# Patient Record
Sex: MALE | Race: WHITE | NOT HISPANIC OR LATINO | URBAN - METROPOLITAN AREA
[De-identification: names, ages, dates, MRNs, and addresses within clinical notes are randomized per-mention and may not be internally consistent; named-entity substitution may affect disease eponyms.]

---

## 2024-12-02 ENCOUNTER — APPOINTMENT (OUTPATIENT)
Dept: PRIMARY CARE | Facility: CLINIC | Age: 39
End: 2024-12-02

## 2024-12-12 ENCOUNTER — APPOINTMENT (OUTPATIENT)
Dept: PRIMARY CARE | Facility: CLINIC | Age: 39
End: 2024-12-12

## 2025-01-07 ENCOUNTER — OFFICE VISIT (OUTPATIENT)
Dept: URGENT CARE | Age: 40
End: 2025-01-07
Payer: COMMERCIAL

## 2025-01-07 VITALS
OXYGEN SATURATION: 99 % | HEART RATE: 84 BPM | DIASTOLIC BLOOD PRESSURE: 84 MMHG | TEMPERATURE: 98.5 F | SYSTOLIC BLOOD PRESSURE: 120 MMHG

## 2025-01-07 DIAGNOSIS — U07.1 COVID-19: Primary | ICD-10-CM

## 2025-01-07 DIAGNOSIS — R68.89 FLU-LIKE SYMPTOMS: ICD-10-CM

## 2025-01-07 LAB
POC BINAX EXPIRATION: 0
POC BINAX NOW COVID SERIAL NUMBER: 0
POC RAPID INFLUENZA A: NEGATIVE
POC RAPID INFLUENZA B: NEGATIVE
POC SARS-COV-2 AG BINAX: ABNORMAL

## 2025-01-07 PROCEDURE — 99203 OFFICE O/P NEW LOW 30 MIN: CPT

## 2025-01-07 PROCEDURE — 87804 INFLUENZA ASSAY W/OPTIC: CPT

## 2025-01-07 PROCEDURE — 87811 SARS-COV-2 COVID19 W/OPTIC: CPT

## 2025-01-07 ASSESSMENT — ENCOUNTER SYMPTOMS
CHILLS: 1
COUGH: 1
FATIGUE: 1
RHINORRHEA: 1

## 2025-01-07 NOTE — PATIENT INSTRUCTIONS
You were seen at Urgent Care today and diagnosed with COVID-19. Please treat as discussed.  Monitor for red flags which we spoke about, If your symptoms change, worsen or become concerning in any way, please go to the emergency room immediately, otherwise you can followup with your PCP in 2-3 days as needed

## 2025-01-07 NOTE — PROGRESS NOTES
Subjective   Patient ID: LISA Haji is a 39 y.o. male. They present today with a chief complaint of flu like symptoms (Flu-like symptoms x 3 days. ).    History of Present Illness  Patient is a pleasant 39-year-old male with no reported past medical history presents urgent care today with flulike symptoms.  He states his symptoms started roughly 3 days ago.  Specifically he endorses runny nose, body aches, congestion and chills.  He notes several other members of his family are currently sick with similar symptoms.  He has been using over-the-counter medication without significant relief.  He denies any chest pain or difficulty breathing.      History provided by:  Patient      Past Medical History  Allergies as of 01/07/2025    (No Known Allergies)       (Not in a hospital admission)         No past medical history on file.    No past surgical history on file.     reports that he has never smoked. He has never used smokeless tobacco. He reports that he does not drink alcohol and does not use drugs.    Review of Systems  Review of Systems   Constitutional:  Positive for chills and fatigue.   HENT:  Positive for congestion and rhinorrhea.    Respiratory:  Positive for cough.                                   Objective    Vitals:    01/07/25 1403   BP: 120/84   BP Location: Left arm   Patient Position: Sitting   BP Cuff Size: Large adult   Pulse: 84   Temp: 36.9 °C (98.5 °F)   TempSrc: Oral   SpO2: 99%     No LMP for male patient.    Physical Exam  Vitals and nursing note reviewed.   Constitutional:       General: He is not in acute distress.     Appearance: Normal appearance. He is not ill-appearing, toxic-appearing or diaphoretic.   HENT:      Head: Normocephalic and atraumatic.      Nose: Congestion present.      Mouth/Throat:      Mouth: Mucous membranes are moist.   Eyes:      Extraocular Movements: Extraocular movements intact.      Conjunctiva/sclera: Conjunctivae normal.      Pupils: Pupils are equal,  round, and reactive to light.   Cardiovascular:      Rate and Rhythm: Normal rate and regular rhythm.      Pulses: Normal pulses.      Heart sounds: Normal heart sounds.   Pulmonary:      Effort: Pulmonary effort is normal. No respiratory distress.      Breath sounds: Normal breath sounds. No stridor. No wheezing, rhonchi or rales.   Chest:      Chest wall: No tenderness.   Musculoskeletal:         General: Normal range of motion.      Cervical back: Normal range of motion and neck supple.   Skin:     General: Skin is warm and dry.      Capillary Refill: Capillary refill takes less than 2 seconds.   Neurological:      General: No focal deficit present.      Mental Status: He is alert and oriented to person, place, and time.   Psychiatric:         Mood and Affect: Mood normal.         Behavior: Behavior normal.         Procedures      Assessment/Plan   Allergies, medications, history, and pertinent labs/EKGs/Imaging reviewed by AVI Barbosa.     Medical Decision Making    Patient is well appearing, afebrile, non toxic, not hypoxic, and appropriate for outpatient treatment and management at time of evaluation. Patient presents with flulike symptoms.     Differential includes but not limited to: COVID, influenza, URI, other    Physical exam as described above.  Rapid flu is negative.  Rapid COVID is positive.    Recommended continued use of over-the-counter medication as needed for symptom relief as well as close follow-up with PCP.  Red flags and ER precautions discussed.  Patient voices understanding and is agreeable to this plan.  He was discharged stable condition.  All questions and concerns addressed.           Orders and Diagnoses  Diagnoses and all orders for this visit:  COVID-19  Flu-like symptoms  -     POCT Covid-19 Rapid Antigen  -     POCT Influenza A/B manually resulted      Medical Admin Record      Follow Up Instructions  No follow-ups on file.    Patient disposition:  Home    Electronically signed by AVI Barbosa  2:10 PM

## 2025-04-15 ENCOUNTER — APPOINTMENT (OUTPATIENT)
Dept: CARDIOLOGY | Facility: HOSPITAL | Age: 40
End: 2025-04-15
Payer: COMMERCIAL

## 2025-04-15 ENCOUNTER — HOSPITAL ENCOUNTER (INPATIENT)
Facility: HOSPITAL | Age: 40
LOS: 2 days | Discharge: HOME | End: 2025-04-17
Attending: EMERGENCY MEDICINE | Admitting: INTERNAL MEDICINE
Payer: COMMERCIAL

## 2025-04-15 ENCOUNTER — APPOINTMENT (OUTPATIENT)
Dept: RADIOLOGY | Facility: HOSPITAL | Age: 40
End: 2025-04-15
Payer: COMMERCIAL

## 2025-04-15 DIAGNOSIS — I21.4 NSTEMI (NON-ST ELEVATED MYOCARDIAL INFARCTION) (MULTI): Primary | ICD-10-CM

## 2025-04-15 DIAGNOSIS — R73.9 HYPERGLYCEMIA: ICD-10-CM

## 2025-04-15 DIAGNOSIS — E10.29 TYPE I (JUVENILE TYPE) DIABETES MELLITUS WITH RENAL MANIFESTATIONS, UNCONTROLLED(250.43) (MULTI): ICD-10-CM

## 2025-04-15 DIAGNOSIS — E10.65 TYPE I (JUVENILE TYPE) DIABETES MELLITUS WITH RENAL MANIFESTATIONS, UNCONTROLLED(250.43) (MULTI): ICD-10-CM

## 2025-04-15 DIAGNOSIS — R06.02 SHORTNESS OF BREATH: ICD-10-CM

## 2025-04-15 LAB
ALBUMIN SERPL BCP-MCNC: 4.1 G/DL (ref 3.4–5)
ALP SERPL-CCNC: 76 U/L (ref 33–120)
ALT SERPL W P-5'-P-CCNC: 19 U/L (ref 10–52)
ANION GAP BLDV CALCULATED.4IONS-SCNC: 11 MMOL/L (ref 10–25)
ANION GAP SERPL CALC-SCNC: 12 MMOL/L (ref 10–20)
AST SERPL W P-5'-P-CCNC: 14 U/L (ref 9–39)
B-OH-BUTYR SERPL-SCNC: 0.18 MMOL/L (ref 0.02–0.27)
BASE EXCESS BLDV CALC-SCNC: -1.4 MMOL/L (ref -2–3)
BASOPHILS # BLD AUTO: 0.05 X10*3/UL (ref 0–0.1)
BASOPHILS NFR BLD AUTO: 0.4 %
BILIRUB DIRECT SERPL-MCNC: 0.1 MG/DL (ref 0–0.3)
BILIRUB SERPL-MCNC: 0.7 MG/DL (ref 0–1.2)
BODY TEMPERATURE: 37 DEGREES CELSIUS
BUN SERPL-MCNC: 16 MG/DL (ref 6–23)
CA-I BLDV-SCNC: 1.2 MMOL/L (ref 1.1–1.33)
CALCIUM SERPL-MCNC: 8.8 MG/DL (ref 8.6–10.3)
CARDIAC TROPONIN I PNL SERPL HS: 116 NG/L (ref 0–20)
CARDIAC TROPONIN I PNL SERPL HS: 129 NG/L (ref 0–20)
CARDIAC TROPONIN I PNL SERPL HS: 71 NG/L (ref 0–20)
CHLORIDE BLDV-SCNC: 101 MMOL/L (ref 98–107)
CHLORIDE SERPL-SCNC: 102 MMOL/L (ref 98–107)
CO2 SERPL-SCNC: 25 MMOL/L (ref 21–32)
CREAT SERPL-MCNC: 1.01 MG/DL (ref 0.5–1.3)
EGFRCR SERPLBLD CKD-EPI 2021: >90 ML/MIN/1.73M*2
EOSINOPHIL # BLD AUTO: 0.04 X10*3/UL (ref 0–0.7)
EOSINOPHIL NFR BLD AUTO: 0.3 %
ERYTHROCYTE [DISTWIDTH] IN BLOOD BY AUTOMATED COUNT: 12.2 % (ref 11.5–14.5)
GLUCOSE BLD MANUAL STRIP-MCNC: 194 MG/DL (ref 74–99)
GLUCOSE BLD MANUAL STRIP-MCNC: 265 MG/DL (ref 74–99)
GLUCOSE BLD MANUAL STRIP-MCNC: 355 MG/DL (ref 74–99)
GLUCOSE BLD MANUAL STRIP-MCNC: 431 MG/DL (ref 74–99)
GLUCOSE BLDV-MCNC: 528 MG/DL (ref 74–99)
GLUCOSE SERPL-MCNC: 470 MG/DL (ref 74–99)
HCO3 BLDV-SCNC: 24.8 MMOL/L (ref 22–26)
HCT VFR BLD AUTO: 41.3 % (ref 41–52)
HCT VFR BLD EST: 45 % (ref 41–52)
HGB BLD-MCNC: 13.8 G/DL (ref 13.5–17.5)
HGB BLDV-MCNC: 14.9 G/DL (ref 13.5–17.5)
IMM GRANULOCYTES # BLD AUTO: 0.05 X10*3/UL (ref 0–0.7)
IMM GRANULOCYTES NFR BLD AUTO: 0.4 % (ref 0–0.9)
INHALED O2 CONCENTRATION: 21 %
LACTATE BLDV-SCNC: 2.4 MMOL/L (ref 0.4–2)
LACTATE BLDV-SCNC: 3.2 MMOL/L (ref 0.4–2)
LACTATE SERPL-SCNC: 1 MMOL/L (ref 0.4–2)
LACTATE SERPL-SCNC: 2.2 MMOL/L (ref 0.4–2)
LACTATE SERPL-SCNC: 2.8 MMOL/L (ref 0.4–2)
LYMPHOCYTES # BLD AUTO: 1.96 X10*3/UL (ref 1.2–4.8)
LYMPHOCYTES NFR BLD AUTO: 16.7 %
MAGNESIUM SERPL-MCNC: 1.7 MG/DL (ref 1.6–2.4)
MCH RBC QN AUTO: 30.1 PG (ref 26–34)
MCHC RBC AUTO-ENTMCNC: 33.4 G/DL (ref 32–36)
MCV RBC AUTO: 90 FL (ref 80–100)
MONOCYTES # BLD AUTO: 0.86 X10*3/UL (ref 0.1–1)
MONOCYTES NFR BLD AUTO: 7.3 %
NEUTROPHILS # BLD AUTO: 8.75 X10*3/UL (ref 1.2–7.7)
NEUTROPHILS NFR BLD AUTO: 74.9 %
NRBC BLD-RTO: 0 /100 WBCS (ref 0–0)
OSMOLALITY SERPL: 305 MOSM/KG (ref 280–300)
OXYHGB MFR BLDV: 77.1 % (ref 45–75)
PCO2 BLDV: 46 MM HG (ref 41–51)
PH BLDV: 7.34 PH (ref 7.33–7.43)
PHOSPHATE SERPL-MCNC: 3.7 MG/DL (ref 2.5–4.9)
PLATELET # BLD AUTO: 186 X10*3/UL (ref 150–450)
PO2 BLDV: 49 MM HG (ref 35–45)
POTASSIUM BLDV-SCNC: 4.4 MMOL/L (ref 3.5–5.3)
POTASSIUM SERPL-SCNC: 4.3 MMOL/L (ref 3.5–5.3)
PROT SERPL-MCNC: 6.7 G/DL (ref 6.4–8.2)
RBC # BLD AUTO: 4.59 X10*6/UL (ref 4.5–5.9)
SAO2 % BLDV: 79 % (ref 45–75)
SODIUM BLDV-SCNC: 132 MMOL/L (ref 136–145)
SODIUM SERPL-SCNC: 135 MMOL/L (ref 136–145)
WBC # BLD AUTO: 11.7 X10*3/UL (ref 4.4–11.3)

## 2025-04-15 PROCEDURE — 82947 ASSAY GLUCOSE BLOOD QUANT: CPT

## 2025-04-15 PROCEDURE — 84132 ASSAY OF SERUM POTASSIUM: CPT | Performed by: EMERGENCY MEDICINE

## 2025-04-15 PROCEDURE — 83735 ASSAY OF MAGNESIUM: CPT | Performed by: EMERGENCY MEDICINE

## 2025-04-15 PROCEDURE — 83605 ASSAY OF LACTIC ACID: CPT | Performed by: EMERGENCY MEDICINE

## 2025-04-15 PROCEDURE — 84681 ASSAY OF C-PEPTIDE: CPT | Mod: AHULAB | Performed by: INTERNAL MEDICINE

## 2025-04-15 PROCEDURE — 84075 ASSAY ALKALINE PHOSPHATASE: CPT | Performed by: EMERGENCY MEDICINE

## 2025-04-15 PROCEDURE — 93005 ELECTROCARDIOGRAM TRACING: CPT

## 2025-04-15 PROCEDURE — 99291 CRITICAL CARE FIRST HOUR: CPT | Performed by: EMERGENCY MEDICINE

## 2025-04-15 PROCEDURE — 83930 ASSAY OF BLOOD OSMOLALITY: CPT | Mod: AHULAB | Performed by: EMERGENCY MEDICINE

## 2025-04-15 PROCEDURE — 71045 X-RAY EXAM CHEST 1 VIEW: CPT

## 2025-04-15 PROCEDURE — 83519 RIA NONANTIBODY: CPT | Performed by: INTERNAL MEDICINE

## 2025-04-15 PROCEDURE — 84484 ASSAY OF TROPONIN QUANT: CPT | Performed by: EMERGENCY MEDICINE

## 2025-04-15 PROCEDURE — 85025 COMPLETE CBC W/AUTO DIFF WBC: CPT | Performed by: EMERGENCY MEDICINE

## 2025-04-15 PROCEDURE — 96360 HYDRATION IV INFUSION INIT: CPT

## 2025-04-15 PROCEDURE — 2500000002 HC RX 250 W HCPCS SELF ADMINISTERED DRUGS (ALT 637 FOR MEDICARE OP, ALT 636 FOR OP/ED): Performed by: INTERNAL MEDICINE

## 2025-04-15 PROCEDURE — 82010 KETONE BODYS QUAN: CPT | Performed by: EMERGENCY MEDICINE

## 2025-04-15 PROCEDURE — 36415 COLL VENOUS BLD VENIPUNCTURE: CPT | Performed by: INTERNAL MEDICINE

## 2025-04-15 PROCEDURE — 84484 ASSAY OF TROPONIN QUANT: CPT | Performed by: INTERNAL MEDICINE

## 2025-04-15 PROCEDURE — 36415 COLL VENOUS BLD VENIPUNCTURE: CPT | Performed by: EMERGENCY MEDICINE

## 2025-04-15 PROCEDURE — 84100 ASSAY OF PHOSPHORUS: CPT | Performed by: EMERGENCY MEDICINE

## 2025-04-15 PROCEDURE — 71045 X-RAY EXAM CHEST 1 VIEW: CPT | Performed by: RADIOLOGY

## 2025-04-15 PROCEDURE — 83605 ASSAY OF LACTIC ACID: CPT | Performed by: INTERNAL MEDICINE

## 2025-04-15 PROCEDURE — 2500000004 HC RX 250 GENERAL PHARMACY W/ HCPCS (ALT 636 FOR OP/ED): Performed by: INTERNAL MEDICINE

## 2025-04-15 PROCEDURE — 1100000001 HC PRIVATE ROOM DAILY

## 2025-04-15 PROCEDURE — 83036 HEMOGLOBIN GLYCOSYLATED A1C: CPT | Mod: AHULAB | Performed by: INTERNAL MEDICINE

## 2025-04-15 PROCEDURE — 2500000004 HC RX 250 GENERAL PHARMACY W/ HCPCS (ALT 636 FOR OP/ED): Performed by: EMERGENCY MEDICINE

## 2025-04-15 RX ORDER — INSULIN GLARGINE 100 [IU]/ML
15 INJECTION, SOLUTION SUBCUTANEOUS NIGHTLY
Status: DISCONTINUED | OUTPATIENT
Start: 2025-04-15 | End: 2025-04-17

## 2025-04-15 RX ORDER — ACETAMINOPHEN 325 MG/1
650 TABLET ORAL EVERY 4 HOURS PRN
Status: DISCONTINUED | OUTPATIENT
Start: 2025-04-15 | End: 2025-04-17 | Stop reason: HOSPADM

## 2025-04-15 RX ORDER — ACETAMINOPHEN 160 MG/5ML
650 SUSPENSION ORAL EVERY 4 HOURS PRN
Status: DISCONTINUED | OUTPATIENT
Start: 2025-04-15 | End: 2025-04-17 | Stop reason: HOSPADM

## 2025-04-15 RX ORDER — POLYETHYLENE GLYCOL 3350 17 G/17G
17 POWDER, FOR SOLUTION ORAL DAILY
Status: DISCONTINUED | OUTPATIENT
Start: 2025-04-15 | End: 2025-04-17 | Stop reason: HOSPADM

## 2025-04-15 RX ORDER — INSULIN LISPRO 100 [IU]/ML
4 INJECTION, SOLUTION INTRAVENOUS; SUBCUTANEOUS
Status: DISCONTINUED | OUTPATIENT
Start: 2025-04-15 | End: 2025-04-17

## 2025-04-15 RX ORDER — SODIUM CHLORIDE 9 MG/ML
100 INJECTION, SOLUTION INTRAVENOUS CONTINUOUS
Status: DISCONTINUED | OUTPATIENT
Start: 2025-04-15 | End: 2025-04-17 | Stop reason: HOSPADM

## 2025-04-15 RX ORDER — DEXTROSE 50 % IN WATER (D50W) INTRAVENOUS SYRINGE
12.5
Status: DISCONTINUED | OUTPATIENT
Start: 2025-04-15 | End: 2025-04-17 | Stop reason: HOSPADM

## 2025-04-15 RX ORDER — ACETAMINOPHEN 650 MG/1
650 SUPPOSITORY RECTAL EVERY 4 HOURS PRN
Status: DISCONTINUED | OUTPATIENT
Start: 2025-04-15 | End: 2025-04-17 | Stop reason: HOSPADM

## 2025-04-15 RX ORDER — DEXTROSE 50 % IN WATER (D50W) INTRAVENOUS SYRINGE
25
Status: DISCONTINUED | OUTPATIENT
Start: 2025-04-15 | End: 2025-04-17 | Stop reason: HOSPADM

## 2025-04-15 RX ORDER — ONDANSETRON 4 MG/1
4 TABLET, FILM COATED ORAL EVERY 8 HOURS PRN
Status: DISCONTINUED | OUTPATIENT
Start: 2025-04-15 | End: 2025-04-17 | Stop reason: HOSPADM

## 2025-04-15 RX ORDER — HEPARIN SODIUM 5000 [USP'U]/ML
5000 INJECTION, SOLUTION INTRAVENOUS; SUBCUTANEOUS EVERY 12 HOURS
Status: DISCONTINUED | OUTPATIENT
Start: 2025-04-15 | End: 2025-04-17 | Stop reason: HOSPADM

## 2025-04-15 RX ORDER — ONDANSETRON HYDROCHLORIDE 2 MG/ML
4 INJECTION, SOLUTION INTRAVENOUS EVERY 8 HOURS PRN
Status: DISCONTINUED | OUTPATIENT
Start: 2025-04-15 | End: 2025-04-17 | Stop reason: HOSPADM

## 2025-04-15 RX ADMIN — INSULIN LISPRO 4 UNITS: 100 INJECTION, SOLUTION INTRAVENOUS; SUBCUTANEOUS at 21:26

## 2025-04-15 RX ADMIN — SODIUM CHLORIDE 100 ML/HR: 0.9 INJECTION, SOLUTION INTRAVENOUS at 21:27

## 2025-04-15 RX ADMIN — SODIUM CHLORIDE 2000 ML: 0.9 INJECTION, SOLUTION INTRAVENOUS at 13:44

## 2025-04-15 RX ADMIN — INSULIN GLARGINE 15 UNITS: 100 INJECTION, SOLUTION SUBCUTANEOUS at 21:26

## 2025-04-15 RX ADMIN — HEPARIN SODIUM 5000 UNITS: 5000 INJECTION, SOLUTION INTRAVENOUS; SUBCUTANEOUS at 21:25

## 2025-04-15 SDOH — SOCIAL STABILITY: SOCIAL INSECURITY: DOES ANYONE TRY TO KEEP YOU FROM HAVING/CONTACTING OTHER FRIENDS OR DOING THINGS OUTSIDE YOUR HOME?: NO

## 2025-04-15 SDOH — HEALTH STABILITY: MENTAL HEALTH: HOW MANY DRINKS CONTAINING ALCOHOL DO YOU HAVE ON A TYPICAL DAY WHEN YOU ARE DRINKING?: PATIENT DOES NOT DRINK

## 2025-04-15 SDOH — ECONOMIC STABILITY: FOOD INSECURITY: HOW HARD IS IT FOR YOU TO PAY FOR THE VERY BASICS LIKE FOOD, HOUSING, MEDICAL CARE, AND HEATING?: NOT VERY HARD

## 2025-04-15 SDOH — ECONOMIC STABILITY: FOOD INSECURITY: WITHIN THE PAST 12 MONTHS, YOU WORRIED THAT YOUR FOOD WOULD RUN OUT BEFORE YOU GOT THE MONEY TO BUY MORE.: NEVER TRUE

## 2025-04-15 SDOH — SOCIAL STABILITY: SOCIAL INSECURITY: WITHIN THE LAST YEAR, HAVE YOU BEEN HUMILIATED OR EMOTIONALLY ABUSED IN OTHER WAYS BY YOUR PARTNER OR EX-PARTNER?: NO

## 2025-04-15 SDOH — ECONOMIC STABILITY: HOUSING INSECURITY: AT ANY TIME IN THE PAST 12 MONTHS, WERE YOU HOMELESS OR LIVING IN A SHELTER (INCLUDING NOW)?: NO

## 2025-04-15 SDOH — HEALTH STABILITY: MENTAL HEALTH: HOW OFTEN DO YOU HAVE A DRINK CONTAINING ALCOHOL?: NEVER

## 2025-04-15 SDOH — ECONOMIC STABILITY: FOOD INSECURITY: WITHIN THE PAST 12 MONTHS, THE FOOD YOU BOUGHT JUST DIDN'T LAST AND YOU DIDN'T HAVE MONEY TO GET MORE.: NEVER TRUE

## 2025-04-15 SDOH — ECONOMIC STABILITY: HOUSING INSECURITY: IN THE LAST 12 MONTHS, WAS THERE A TIME WHEN YOU WERE NOT ABLE TO PAY THE MORTGAGE OR RENT ON TIME?: NO

## 2025-04-15 SDOH — SOCIAL STABILITY: SOCIAL INSECURITY
WITHIN THE LAST YEAR, HAVE YOU BEEN KICKED, HIT, SLAPPED, OR OTHERWISE PHYSICALLY HURT BY YOUR PARTNER OR EX-PARTNER?: NO

## 2025-04-15 SDOH — SOCIAL STABILITY: SOCIAL INSECURITY: HAVE YOU HAD THOUGHTS OF HARMING ANYONE ELSE?: NO

## 2025-04-15 SDOH — HEALTH STABILITY: MENTAL HEALTH: HOW OFTEN DO YOU HAVE SIX OR MORE DRINKS ON ONE OCCASION?: NEVER

## 2025-04-15 SDOH — ECONOMIC STABILITY: HOUSING INSECURITY: IN THE PAST 12 MONTHS, HOW MANY TIMES HAVE YOU MOVED WHERE YOU WERE LIVING?: 0

## 2025-04-15 SDOH — SOCIAL STABILITY: SOCIAL INSECURITY: WITHIN THE LAST YEAR, HAVE YOU BEEN AFRAID OF YOUR PARTNER OR EX-PARTNER?: NO

## 2025-04-15 SDOH — ECONOMIC STABILITY: INCOME INSECURITY: IN THE PAST 12 MONTHS HAS THE ELECTRIC, GAS, OIL, OR WATER COMPANY THREATENED TO SHUT OFF SERVICES IN YOUR HOME?: NO

## 2025-04-15 SDOH — SOCIAL STABILITY: SOCIAL INSECURITY: HAVE YOU HAD ANY THOUGHTS OF HARMING ANYONE ELSE?: NO

## 2025-04-15 SDOH — SOCIAL STABILITY: SOCIAL INSECURITY
WITHIN THE LAST YEAR, HAVE YOU BEEN RAPED OR FORCED TO HAVE ANY KIND OF SEXUAL ACTIVITY BY YOUR PARTNER OR EX-PARTNER?: NO

## 2025-04-15 SDOH — ECONOMIC STABILITY: TRANSPORTATION INSECURITY: IN THE PAST 12 MONTHS, HAS LACK OF TRANSPORTATION KEPT YOU FROM MEDICAL APPOINTMENTS OR FROM GETTING MEDICATIONS?: NO

## 2025-04-15 SDOH — SOCIAL STABILITY: SOCIAL INSECURITY: ABUSE: ADULT

## 2025-04-15 SDOH — SOCIAL STABILITY: SOCIAL INSECURITY: ARE THERE ANY APPARENT SIGNS OF INJURIES/BEHAVIORS THAT COULD BE RELATED TO ABUSE/NEGLECT?: NO

## 2025-04-15 SDOH — SOCIAL STABILITY: SOCIAL INSECURITY: WERE YOU ABLE TO COMPLETE ALL THE BEHAVIORAL HEALTH SCREENINGS?: YES

## 2025-04-15 SDOH — SOCIAL STABILITY: SOCIAL INSECURITY: ARE YOU OR HAVE YOU BEEN THREATENED OR ABUSED PHYSICALLY, EMOTIONALLY, OR SEXUALLY BY ANYONE?: NO

## 2025-04-15 SDOH — SOCIAL STABILITY: SOCIAL INSECURITY: DO YOU FEEL ANYONE HAS EXPLOITED OR TAKEN ADVANTAGE OF YOU FINANCIALLY OR OF YOUR PERSONAL PROPERTY?: NO

## 2025-04-15 SDOH — SOCIAL STABILITY: SOCIAL INSECURITY: HAS ANYONE EVER THREATENED TO HURT YOUR FAMILY OR YOUR PETS?: NO

## 2025-04-15 SDOH — SOCIAL STABILITY: SOCIAL INSECURITY: DO YOU FEEL UNSAFE GOING BACK TO THE PLACE WHERE YOU ARE LIVING?: NO

## 2025-04-15 ASSESSMENT — ACTIVITIES OF DAILY LIVING (ADL)
HEARING - LEFT EAR: FUNCTIONAL
BATHING: INDEPENDENT
TOILETING: INDEPENDENT
LACK_OF_TRANSPORTATION: NO
GROOMING: INDEPENDENT
WALKS IN HOME: INDEPENDENT
PATIENT'S MEMORY ADEQUATE TO SAFELY COMPLETE DAILY ACTIVITIES?: YES
FEEDING YOURSELF: INDEPENDENT
ADEQUATE_TO_COMPLETE_ADL: YES
DRESSING YOURSELF: INDEPENDENT
JUDGMENT_ADEQUATE_SAFELY_COMPLETE_DAILY_ACTIVITIES: YES
HEARING - RIGHT EAR: FUNCTIONAL

## 2025-04-15 ASSESSMENT — COGNITIVE AND FUNCTIONAL STATUS - GENERAL
DAILY ACTIVITIY SCORE: 24
PATIENT BASELINE BEDBOUND: NO
MOBILITY SCORE: 24

## 2025-04-15 ASSESSMENT — COLUMBIA-SUICIDE SEVERITY RATING SCALE - C-SSRS
2. HAVE YOU ACTUALLY HAD ANY THOUGHTS OF KILLING YOURSELF?: NO
6. HAVE YOU EVER DONE ANYTHING, STARTED TO DO ANYTHING, OR PREPARED TO DO ANYTHING TO END YOUR LIFE?: NO
1. IN THE PAST MONTH, HAVE YOU WISHED YOU WERE DEAD OR WISHED YOU COULD GO TO SLEEP AND NOT WAKE UP?: NO

## 2025-04-15 ASSESSMENT — PAIN - FUNCTIONAL ASSESSMENT: PAIN_FUNCTIONAL_ASSESSMENT: 0-10

## 2025-04-15 ASSESSMENT — PAIN SCALES - GENERAL
PAINLEVEL_OUTOF10: 0 - NO PAIN
PAINLEVEL_OUTOF10: 0 - NO PAIN

## 2025-04-15 ASSESSMENT — PATIENT HEALTH QUESTIONNAIRE - PHQ9
SUM OF ALL RESPONSES TO PHQ9 QUESTIONS 1 & 2: 0
1. LITTLE INTEREST OR PLEASURE IN DOING THINGS: NOT AT ALL
2. FEELING DOWN, DEPRESSED OR HOPELESS: NOT AT ALL

## 2025-04-15 ASSESSMENT — LIFESTYLE VARIABLES
AUDIT-C TOTAL SCORE: 0
SKIP TO QUESTIONS 9-10: 1

## 2025-04-15 NOTE — ED TRIAGE NOTES
Pt here by EMS for dizziness/hyperglycemia; pt reports hx of type 1 diabetes with insurance lapse has had to give himself subcutaneous injections;     EMS gave pt 350ml NS

## 2025-04-15 NOTE — PROGRESS NOTES
Pharmacy Medication History     Source of Information: Patient and also stated he hasn't taken his insulin's in more than a few months. Pt. Feel like his dose's need to be adjusted.    Additional concerns with the patient's PTA list.   N/A  Notified Provider via Haiku : No    The following updates were made to the Prior to Admission medication list:     Medications ADDED:   N/A  Medications CHANGED:  N/A  Medications REMOVED:   N/A  Medications NOT TAKING:   Novolin N NPH U-100 injection   Novolin R U100 Insulin injection     Allergy reviewed : Yes    Meds 2 Beds : Yes    Outpatient pharmacy confirmed and updated in chart : Yes    Pharmacy name: [facility] - [place] [id number]    The list below reflectives the updated PTA list. Please review each medication in order reconciliation for additional clarification and justification.    Prior to Admission Medications   Prescriptions Last Dose   NovoLIN N NPH U-100 Insulin 100 unit/mL injection More than a month   Sig: Inject 10 Units under the skin 2 times a day before meals.   Patient not taking: Reported on 4/15/2025   NovoLIN R Regular U100 Insulin 100 unit/mL injection More than a month   Si Units.   Patient not taking: Reported on 4/15/2025      Facility-Administered Medications: None       The list below reflectives the updated allergy list. Please review each documented allergy for additional clarification and justification.    No Known Allergies       04/15/25 at 5:04 PM - Yady Erickson

## 2025-04-16 ENCOUNTER — APPOINTMENT (OUTPATIENT)
Dept: CARDIOLOGY | Facility: HOSPITAL | Age: 40
End: 2025-04-16
Payer: COMMERCIAL

## 2025-04-16 DIAGNOSIS — E13.9: Primary | ICD-10-CM

## 2025-04-16 LAB
ALBUMIN SERPL BCP-MCNC: 3.4 G/DL (ref 3.4–5)
ALP SERPL-CCNC: 60 U/L (ref 33–120)
ALT SERPL W P-5'-P-CCNC: 17 U/L (ref 10–52)
ANION GAP SERPL CALC-SCNC: 11 MMOL/L (ref 10–20)
AORTIC VALVE MEAN GRADIENT: 3 MMHG
AORTIC VALVE PEAK VELOCITY: 1.2 M/S
AST SERPL W P-5'-P-CCNC: 15 U/L (ref 9–39)
ATRIAL RATE: 84 BPM
ATRIAL RATE: 86 BPM
AV PEAK GRADIENT: 6 MMHG
AVA (PEAK VEL): 3.46 CM2
AVA (VTI): 3.47 CM2
BILIRUB SERPL-MCNC: 0.8 MG/DL (ref 0–1.2)
BUN SERPL-MCNC: 17 MG/DL (ref 6–23)
C PEPTIDE SERPL-MCNC: 1.8 NG/ML (ref 0.7–3.9)
CALCIUM SERPL-MCNC: 8.5 MG/DL (ref 8.6–10.3)
CHLORIDE SERPL-SCNC: 110 MMOL/L (ref 98–107)
CHOLEST SERPL-MCNC: 146 MG/DL (ref 0–199)
CHOLESTEROL/HDL RATIO: 3.4
CO2 SERPL-SCNC: 25 MMOL/L (ref 21–32)
CREAT SERPL-MCNC: 0.79 MG/DL (ref 0.5–1.3)
EGFRCR SERPLBLD CKD-EPI 2021: >90 ML/MIN/1.73M*2
EJECTION FRACTION APICAL 4 CHAMBER: 64.9
EJECTION FRACTION: 61 %
ERYTHROCYTE [DISTWIDTH] IN BLOOD BY AUTOMATED COUNT: 12.3 % (ref 11.5–14.5)
EST. AVERAGE GLUCOSE BLD GHB EST-MCNC: 249 MG/DL
GLUCOSE BLD MANUAL STRIP-MCNC: 101 MG/DL (ref 74–99)
GLUCOSE BLD MANUAL STRIP-MCNC: 112 MG/DL (ref 74–99)
GLUCOSE BLD MANUAL STRIP-MCNC: 161 MG/DL (ref 74–99)
GLUCOSE BLD MANUAL STRIP-MCNC: 173 MG/DL (ref 74–99)
GLUCOSE SERPL-MCNC: 203 MG/DL (ref 74–99)
HBA1C MFR BLD: 10.3 %
HCT VFR BLD AUTO: 39.7 % (ref 41–52)
HDLC SERPL-MCNC: 42.8 MG/DL
HGB BLD-MCNC: 13.4 G/DL (ref 13.5–17.5)
LDLC SERPL CALC-MCNC: 80 MG/DL
LEFT ATRIUM VOLUME AREA LENGTH INDEX BSA: 32.8 ML/M2
LEFT VENTRICLE INTERNAL DIMENSION DIASTOLE: 4.74 CM (ref 3.5–6)
LEFT VENTRICULAR OUTFLOW TRACT DIAMETER: 2.07 CM
MAGNESIUM SERPL-MCNC: 1.78 MG/DL (ref 1.6–2.4)
MCH RBC QN AUTO: 30.4 PG (ref 26–34)
MCHC RBC AUTO-ENTMCNC: 33.8 G/DL (ref 32–36)
MCV RBC AUTO: 90 FL (ref 80–100)
MITRAL VALVE E/A RATIO: 1.59
NON HDL CHOLESTEROL: 103 MG/DL (ref 0–149)
NRBC BLD-RTO: 0 /100 WBCS (ref 0–0)
P AXIS: -13 DEGREES
P AXIS: 46 DEGREES
P OFFSET: 198 MS
P OFFSET: 200 MS
P ONSET: 140 MS
P ONSET: 148 MS
PLATELET # BLD AUTO: 177 X10*3/UL (ref 150–450)
POTASSIUM SERPL-SCNC: 3.7 MMOL/L (ref 3.5–5.3)
PR INTERVAL: 144 MS
PR INTERVAL: 164 MS
PROT SERPL-MCNC: 5.6 G/DL (ref 6.4–8.2)
Q ONSET: 220 MS
Q ONSET: 222 MS
QRS COUNT: 13 BEATS
QRS COUNT: 14 BEATS
QRS DURATION: 82 MS
QRS DURATION: 92 MS
QT INTERVAL: 348 MS
QT INTERVAL: 364 MS
QTC CALCULATION(BAZETT): 416 MS
QTC CALCULATION(BAZETT): 430 MS
QTC FREDERICIA: 392 MS
QTC FREDERICIA: 407 MS
R AXIS: -14 DEGREES
R AXIS: -7 DEGREES
RBC # BLD AUTO: 4.41 X10*6/UL (ref 4.5–5.9)
RIGHT VENTRICLE FREE WALL PEAK S': 12 CM/S
RIGHT VENTRICLE PEAK SYSTOLIC PRESSURE: 25.3 MMHG
SODIUM SERPL-SCNC: 142 MMOL/L (ref 136–145)
T AXIS: -12 DEGREES
T AXIS: 46 DEGREES
T OFFSET: 396 MS
T OFFSET: 402 MS
TRICUSPID ANNULAR PLANE SYSTOLIC EXCURSION: 2.2 CM
TRIGL SERPL-MCNC: 115 MG/DL (ref 0–149)
VENTRICULAR RATE: 84 BPM
VENTRICULAR RATE: 86 BPM
VLDL: 23 MG/DL (ref 0–40)
WBC # BLD AUTO: 9.4 X10*3/UL (ref 4.4–11.3)

## 2025-04-16 PROCEDURE — 93005 ELECTROCARDIOGRAM TRACING: CPT

## 2025-04-16 PROCEDURE — 2500000004 HC RX 250 GENERAL PHARMACY W/ HCPCS (ALT 636 FOR OP/ED): Performed by: INTERNAL MEDICINE

## 2025-04-16 PROCEDURE — 99223 1ST HOSP IP/OBS HIGH 75: CPT | Performed by: STUDENT IN AN ORGANIZED HEALTH CARE EDUCATION/TRAINING PROGRAM

## 2025-04-16 PROCEDURE — 1100000001 HC PRIVATE ROOM DAILY

## 2025-04-16 PROCEDURE — 2500000001 HC RX 250 WO HCPCS SELF ADMINISTERED DRUGS (ALT 637 FOR MEDICARE OP): Performed by: INTERNAL MEDICINE

## 2025-04-16 PROCEDURE — 99221 1ST HOSP IP/OBS SF/LOW 40: CPT | Performed by: INTERNAL MEDICINE

## 2025-04-16 PROCEDURE — 93306 TTE W/DOPPLER COMPLETE: CPT | Performed by: STUDENT IN AN ORGANIZED HEALTH CARE EDUCATION/TRAINING PROGRAM

## 2025-04-16 PROCEDURE — 2500000002 HC RX 250 W HCPCS SELF ADMINISTERED DRUGS (ALT 637 FOR MEDICARE OP, ALT 636 FOR OP/ED): Performed by: INTERNAL MEDICINE

## 2025-04-16 PROCEDURE — 80053 COMPREHEN METABOLIC PANEL: CPT | Performed by: INTERNAL MEDICINE

## 2025-04-16 PROCEDURE — 93306 TTE W/DOPPLER COMPLETE: CPT

## 2025-04-16 PROCEDURE — 83735 ASSAY OF MAGNESIUM: CPT | Performed by: INTERNAL MEDICINE

## 2025-04-16 PROCEDURE — 84478 ASSAY OF TRIGLYCERIDES: CPT | Performed by: NURSE PRACTITIONER

## 2025-04-16 PROCEDURE — 36415 COLL VENOUS BLD VENIPUNCTURE: CPT | Performed by: INTERNAL MEDICINE

## 2025-04-16 PROCEDURE — 82947 ASSAY GLUCOSE BLOOD QUANT: CPT

## 2025-04-16 PROCEDURE — 85027 COMPLETE CBC AUTOMATED: CPT | Performed by: INTERNAL MEDICINE

## 2025-04-16 RX ADMIN — ONDANSETRON HYDROCHLORIDE 4 MG: 4 TABLET, FILM COATED ORAL at 12:18

## 2025-04-16 RX ADMIN — INSULIN GLARGINE 15 UNITS: 100 INJECTION, SOLUTION SUBCUTANEOUS at 21:15

## 2025-04-16 RX ADMIN — ACETAMINOPHEN 650 MG: 325 TABLET ORAL at 17:05

## 2025-04-16 RX ADMIN — INSULIN LISPRO 4 UNITS: 100 INJECTION, SOLUTION INTRAVENOUS; SUBCUTANEOUS at 17:00

## 2025-04-16 RX ADMIN — HEPARIN SODIUM 5000 UNITS: 5000 INJECTION, SOLUTION INTRAVENOUS; SUBCUTANEOUS at 21:15

## 2025-04-16 RX ADMIN — SODIUM CHLORIDE 100 ML/HR: 0.9 INJECTION, SOLUTION INTRAVENOUS at 17:01

## 2025-04-16 RX ADMIN — SODIUM CHLORIDE 100 ML/HR: 0.9 INJECTION, SOLUTION INTRAVENOUS at 06:56

## 2025-04-16 RX ADMIN — INSULIN LISPRO 4 UNITS: 100 INJECTION, SOLUTION INTRAVENOUS; SUBCUTANEOUS at 12:18

## 2025-04-16 RX ADMIN — INSULIN LISPRO 4 UNITS: 100 INJECTION, SOLUTION INTRAVENOUS; SUBCUTANEOUS at 08:33

## 2025-04-16 SDOH — SOCIAL STABILITY: SOCIAL INSECURITY: ARE YOU MARRIED, WIDOWED, DIVORCED, SEPARATED, NEVER MARRIED, OR LIVING WITH A PARTNER?: LIVING WITH PARTNER

## 2025-04-16 SDOH — ECONOMIC STABILITY: FOOD INSECURITY: WITHIN THE PAST 12 MONTHS, THE FOOD YOU BOUGHT JUST DIDN'T LAST AND YOU DIDN'T HAVE MONEY TO GET MORE.: NEVER TRUE

## 2025-04-16 SDOH — HEALTH STABILITY: MENTAL HEALTH: HOW OFTEN DO YOU HAVE SIX OR MORE DRINKS ON ONE OCCASION?: NEVER

## 2025-04-16 SDOH — ECONOMIC STABILITY: FOOD INSECURITY: WITHIN THE PAST 12 MONTHS, YOU WORRIED THAT YOUR FOOD WOULD RUN OUT BEFORE YOU GOT THE MONEY TO BUY MORE.: NEVER TRUE

## 2025-04-16 SDOH — HEALTH STABILITY: MENTAL HEALTH: HOW OFTEN DO YOU HAVE A DRINK CONTAINING ALCOHOL?: 2-4 TIMES A MONTH

## 2025-04-16 SDOH — HEALTH STABILITY: MENTAL HEALTH: HOW MANY DRINKS CONTAINING ALCOHOL DO YOU HAVE ON A TYPICAL DAY WHEN YOU ARE DRINKING?: 1 OR 2

## 2025-04-16 SDOH — ECONOMIC STABILITY: HOUSING INSECURITY: AT ANY TIME IN THE PAST 12 MONTHS, WERE YOU HOMELESS OR LIVING IN A SHELTER (INCLUDING NOW)?: NO

## 2025-04-16 SDOH — ECONOMIC STABILITY: HOUSING INSECURITY: IN THE PAST 12 MONTHS, HOW MANY TIMES HAVE YOU MOVED WHERE YOU WERE LIVING?: 0

## 2025-04-16 SDOH — ECONOMIC STABILITY: FOOD INSECURITY: HOW HARD IS IT FOR YOU TO PAY FOR THE VERY BASICS LIKE FOOD, HOUSING, MEDICAL CARE, AND HEATING?: NOT HARD AT ALL

## 2025-04-16 SDOH — ECONOMIC STABILITY: HOUSING INSECURITY: IN THE LAST 12 MONTHS, WAS THERE A TIME WHEN YOU WERE NOT ABLE TO PAY THE MORTGAGE OR RENT ON TIME?: NO

## 2025-04-16 SDOH — ECONOMIC STABILITY: INCOME INSECURITY: IN THE PAST 12 MONTHS HAS THE ELECTRIC, GAS, OIL, OR WATER COMPANY THREATENED TO SHUT OFF SERVICES IN YOUR HOME?: NO

## 2025-04-16 SDOH — ECONOMIC STABILITY: TRANSPORTATION INSECURITY: IN THE PAST 12 MONTHS, HAS LACK OF TRANSPORTATION KEPT YOU FROM MEDICAL APPOINTMENTS OR FROM GETTING MEDICATIONS?: NO

## 2025-04-16 ASSESSMENT — COGNITIVE AND FUNCTIONAL STATUS - GENERAL
DAILY ACTIVITIY SCORE: 24
MOBILITY SCORE: 24

## 2025-04-16 ASSESSMENT — LIFESTYLE VARIABLES
AUDIT-C TOTAL SCORE: 2
SKIP TO QUESTIONS 9-10: 1

## 2025-04-16 ASSESSMENT — PAIN SCALES - GENERAL
PAINLEVEL_OUTOF10: 7
PAINLEVEL_OUTOF10: 0 - NO PAIN
PAINLEVEL_OUTOF10: 0 - NO PAIN

## 2025-04-16 ASSESSMENT — ENCOUNTER SYMPTOMS
SHORTNESS OF BREATH: 0
UNEXPECTED WEIGHT CHANGE: 0
ENDOCRINE COMMENTS: AS ABOVE

## 2025-04-16 ASSESSMENT — ACTIVITIES OF DAILY LIVING (ADL): LACK_OF_TRANSPORTATION: NO

## 2025-04-16 ASSESSMENT — PAIN DESCRIPTION - LOCATION: LOCATION: HEAD

## 2025-04-16 NOTE — PROGRESS NOTES
I was asked to speak with this patient by Diabetic Educator Hardy regarding if any pharmacy barriers.    I spoke with patient at bedside and we discussed pharmacy costs. He reports he was using the discount [facility] insulin when he did not have insurance. I did not fully probe on why he stopped taking insulin due to him being drowsy.     Discussed that we can get most insulins to $35/month with copay cards, but his insurance is showing he has a deductible. Did mention if the plan is for a basal/bolus regimen, the different insulins would each have a copay so total might be $70 per month which patient seems ok with.     *Ran test claims on Novolin N, Humalog, and insulin aspart. All show deductible/copay >$35 on test. Insurance does not indicate any product preference on these test claims.*    Discussed with patient CGMs are not covered under his pharmacy benefit but he could call his insurance to see if they might be covered under his medical benefit through DME. The Lisa device is $75/month without insurance coverage which patient seems ok with.    Briefly mentioned a few preventative medications, like statin and ACEi/ARB for cardiovascular and renal protection in diabetes.     Patient does report he is interested in establishing with a new PCP. Discussed briefly on Healthy at Home which he is open to.     Happy to come back in the am if patient has any questions for me/needs additional pharmacy counseling. Feel free to reach out if any further test claims are needed on insulin products prior to scripts being sent.    Please [facility] med[facility]supplies [facility] for assistance with copays/coupons.    Patrica Snyder, ZainD

## 2025-04-16 NOTE — CARE PLAN
The patient's goals for the shift include      The clinical goals for the shift include Pt to remain HDS this shift      Problem: Pain - Adult  Goal: Verbalizes/displays adequate comfort level or baseline comfort level  Outcome: Progressing     Problem: Safety - Adult  Goal: Free from fall injury  Outcome: Progressing     Problem: Discharge Planning  Goal: Discharge to home or other facility with appropriate resources  Outcome: Progressing     Problem: Chronic Conditions and Co-morbidities  Goal: Patient's chronic conditions and co-morbidity symptoms are monitored and maintained or improved  Outcome: Progressing     Problem: Nutrition  Goal: Nutrient intake appropriate for maintaining nutritional needs  Outcome: Progressing     Problem: Diabetes  Goal: Achieve decreasing blood glucose levels by end of shift  Outcome: Progressing  Goal: Increase stability of blood glucose readings by end of shift  Outcome: Progressing  Goal: Decrease in ketones present in urine by end of shift  Outcome: Progressing  Goal: Maintain electrolyte levels within acceptable range throughout shift  Outcome: Progressing  Goal: Maintain glucose levels >70mg/dl to <250mg/dl throughout shift  Outcome: Progressing  Goal: No changes in neurological exam by end of shift  Outcome: Progressing  Goal: Learn about and adhere to nutrition recommendations by end of shift  Outcome: Progressing  Goal: Vital signs within normal range for age by end of shift  Outcome: Progressing  Goal: Increase self care and/or family involovement by end of shift  Outcome: Progressing  Goal: Receive DSME education by end of shift  Outcome: Progressing

## 2025-04-16 NOTE — CONSULTS
"Reason For Consult  Uncontrolled DM.     History Of Present Illness  Reese Haji is a 39 y.o. male presenting with dizziness/hyperglycemia.     Past Medical History  He has a past medical history of Diabetes 1.5, managed as type 1 (Multi).    Surgical History  He has a past surgical history that includes Hernia repair (04/18/2016).     Social History  He reports that he has never smoked. He has never used smokeless tobacco. He reports current alcohol use. He reports current drug use. Drug: Marijuana.    Family History  Family History[1]     Allergies  Patient has no known allergies.    Review of Systems  Denies N/V or dizziness  Denies chest pain     Physical Exam  NAD, pleasant, lying in bed. Engaged in consult     Last Recorded Vitals  Blood pressure 141/88, pulse 69, temperature 35.9 °C (96.6 °F), temperature source Temporal, resp. rate 18, height 1.778 m (5' 10\"), weight 74.8 kg (165 lb), SpO2 99%.    Relevant Results  HbA1C 10.3%  Last POCT 161mg/dl     Assessment/Plan   Reese Haji is a 39 year old T1DM with limited medical history noted. Yesterday, he experience a near syncopal episode, his BG was 350mg/dl and was brought in for evaluation.     DM/Hx  -states he has was diagnosed with T2DM but recently told it progressed to T1DM  -had acute episode 10 months ago  -endorses peripheral lower extremity neuropathy and vision changes  -denies personal or family CV, HTN or kidney disease  -very strong family history of DM  -does need new PCP and establish care with endocrinology   -referral list given    Medications/BG monitoring  -was on basal/bolus insulin    -loss of insurance prohibited him for obtaining insulin  -reports vision changes and \"see black dots\" shortly after taking insulin   -attributed it to low BG; would treat accordingly and symptoms would resolve  -will review insulin pen administration   -has not been checking BG  -interested in CGM   -MTB assisting    -CGM training/education when " warranted    Diet/exercise  -pt reports very busy/ stressful work-life   -manager at restaurant  -young kids at home  -is very knowledgeable about diet    -made diet changes when first diagnosed 7-8 years ago   -notes he counts carbs, avoids sugar   -difficult finding time to eat, typically 1 meal /day    -encouraged more frequent meals and snacks   -seems to be adhering to DM diet    Plan  -home med recs  -CGM insurance coverage vs out of pocket  -follow up with endocrinology  -discharge when med ready  Vidal Sue RN         [1] No family history on file.

## 2025-04-16 NOTE — PROGRESS NOTES
PROGRESS NOTE - INTERNAL MEDICINE     PATIENT NAME:  Reese Haji    MRN:  [id number]  SERVICE DATE:  4/16/2025       ADMITTING PHYSICIAN:  Marielle Tyler MD    ASSESSMENT AND PLAN    Orthostatic hypotension with dizziness, from hypovolemia; resolved with hydration today  DM type I, uncontrolled from lack of medications (started as DM 2 at age 23, suspected to have transformed to DM1 in 2024, based on very low C-peptide levels). C peptide 1.8, A1c 10.3  this adm.  Elevated troponin, uncertain etiology.  Probable stress-induced  Acute Lactic acidosis, resolved with hydration     Plan:  Resume long-acting insulin and Premeal lispro.  Endocrine/cardiology consult.  Telemetry.  Counseled on compliance with medications and appropriate diet.  Diabetic nurse education.  Check [facility] 65 antibody.    Encourage aggressive hydration and monitor orthostatics.      DISPOSITION PLAN:  Pending cardiol and endocrine input.    INTERVAL HISTORY OF PRESENT ILLNESS:  nausea and dry heaving today afternoon. No orthostatic dizziness. No chest pain/sob. No n/v/d. Oral intake good. Feeling better. No fever/chills. acute events from last 24 hrs reviewed.    Pertinent ROS:  No abdominal pain / No Bleeding / No rashes    Discussed with nursing and case management team and the specialists involved in this patient's care. Reviewed the EMR and documentation from other care-givers.      OBJECTIVE  PHYSICAL EXAM:     GENERAL: AAOx3, cooperative resting comfortably  SKIN: Skin turgor normal. No rashes  HEENT: no epistaxis, Moist mucosa.  LUNGS: Bilat breath sounds, with no added sounds  CARDIAC: REGULAR. no rubs, no murmur  ABDOMEN: soft, non-tender. +BS.  EXTREMITIES: No edema, Good capillary refill.   NEURO: Insight GOOD. No invol movements.   MUSCULOSKELETAL: No acute inflammation            4/15/2025     8:20 PM 4/15/2025     8:21 PM 4/16/2025    12:38 AM 4/16/2025     4:23 AM 4/16/2025     4:24 AM 4/16/2025     4:25 AM 4/16/2025     8:07 AM    Vitals   Systolic 144 127 135 124 134 145 149   Diastolic 95 92 90 72 78 78 86   BP Location Right arm Right arm Right arm Right arm Right arm Right arm Right arm   Heart Rate 75 81 75 64 66 70 74   Temp 36 °C (96.8 °F) 36.1 °C (97 °F) 36 °C (96.8 °F) 36 °C (96.8 °F) 36.3 °C (97.3 °F) 36.3 °C (97.3 °F) 36.1 °C (97 °F)   Resp 18 18 18 18 18 18 17     Body mass index is 23.68 kg/m².    Intake/Output Summary (Last 24 hours) at 4/16/2025 1141  Last data filed at 4/16/2025 0313  Gross per 24 hour   Intake 576.66 ml   Output --   Net 576.66 ml         Current Medications[1]    DATA:   Diagnostic tests reviewed for today's visit:    Most recent labs  Results from last 7 days   Lab Units 04/16/25  0518 04/15/25  1319   WBC AUTO x10*3/uL 9.4 11.7*   HEMOGLOBIN g/dL 13.4* 13.8   HEMATOCRIT % 39.7* 41.3   PLATELETS AUTO x10*3/uL 177 186     Results from last 7 days   Lab Units 04/16/25  0518 04/15/25  1319   SODIUM mmol/L 142 135*   POTASSIUM mmol/L 3.7 4.3   CHLORIDE mmol/L 110* 102   CO2 mmol/L 25 25   BUN mg/dL 17 16   CREATININE mg/dL 0.79 1.01   CALCIUM mg/dL 8.5* 8.8   PROTEIN TOTAL g/dL 5.6* 6.7   BILIRUBIN TOTAL mg/dL 0.8 0.7   ALK PHOS U/L 60 76   ALT U/L 17 19   AST U/L 15 14   GLUCOSE mg/dL 203* 470*             Results from last 7 days   Lab Units 04/16/25  0803 04/15/25  2003 04/15/25  1720 04/15/25  1425 04/15/25  1312   POCT GLUCOSE mg/dL 173* 194* 265* 355* 431*        XR chest 1 view   Final Result   1.  No evidence of acute cardiopulmonary process.                  MACRO:   None        Signed by: Dmitry Alberto 4/15/2025 4:53 PM   Dictation workstation:   [id number]      Transthoracic Echo (TTE) Complete    (Results Pending)   Nuclear Stress Test    (Results Pending)         SIGNATURE: Marielle Tyler MD PATIENT NAME: Reese Haji   DATE: 4/16/2025 MRN: [id number]   TIME: 11:41 AM             [1]   Current Facility-Administered Medications:     acetaminophen (Tylenol) tablet 650 mg, 650 mg, oral,  q4h PRN **OR** acetaminophen (Tylenol) suspension 650 mg, 650 mg, oral, q4h PRN **OR** acetaminophen (Tylenol) suppository 650 mg, 650 mg, rectal, q4h PRN, Marielle Tyler MD    dextrose 50 % injection 12.5 g, 12.5 g, intravenous, q15 min PRN, Marielle Tyler MD    dextrose 50 % injection 25 g, 25 g, intravenous, q15 min PRN, Marielle Tyler MD    glucagon (Glucagen) injection 1 mg, 1 mg, intramuscular, q15 min PRN, Marielle Tyler MD    glucagon (Glucagen) injection 1 mg, 1 mg, intramuscular, q15 min PRN, Marielle Tyler MD    heparin (porcine) injection 5,000 Units, 5,000 Units, subcutaneous, q12h, Marielle Tyler MD, 5,000 Units at 04/15/25 2125    insulin glargine (Lantus) injection 15 Units, 15 Units, subcutaneous, Nightly, Marielle Tyler MD, 15 Units at 04/15/25 2126    insulin lispro injection 4 Units, 4 Units, subcutaneous, TID AC, Marielle Tyler MD, 4 Units at 04/16/25 0833    ondansetron (Zofran) tablet 4 mg, 4 mg, oral, q8h PRN **OR** ondansetron (Zofran) injection 4 mg, 4 mg, intravenous, q8h PRN, Marielle Tyler MD    polyethylene glycol (Glycolax, Miralax) packet 17 g, 17 g, oral, Daily, Marielle Tyler MD    sodium chloride 0.9% infusion, 100 mL/hr, intravenous, Continuous, Marielle Tyler MD, Last Rate: 100 mL/hr at 04/16/25 0857, 100 mL/hr at 04/16/25 0857

## 2025-04-16 NOTE — PROGRESS NOTES
04/16/25 1052   Discharge Planning   Living Arrangements Spouse/significant other;Children   Support Systems Spouse/significant other;Children   Assistance Needed none   Type of Residence Private residence   Number of Stairs to Enter Residence 4   Number of Stairs Within Residence 12   Who is requesting discharge planning? Provider   Home or Post Acute Services None   Expected Discharge Disposition Home   Does the patient need discharge transport arranged? No   Financial Resource Strain   How hard is it for you to pay for the very basics like food, housing, medical care, and heating? Not hard   Housing Stability   In the last 12 months, was there a time when you were not able to pay the mortgage or rent on time? N   In the past 12 months, how many times have you moved where you were living? 0   At any time in the past 12 months, were you homeless or living in a shelter (including now)? N   Transportation Needs   In the past 12 months, has lack of transportation kept you from medical appointments or from getting medications? no   In the past 12 months, has lack of transportation kept you from meetings, work, or from getting things needed for daily living? No   Stroke Family Assessment   Stroke Family Assessment Needed No   Intensity of Service   Intensity of Service 0-30 min     4/16/25 1052  Met with patient at bedside to discuss discharge planning.  PCP is Dr Kiki Winters and pharmacy is [facility] on [address] in [place].  He lives at home with his fiance and three kids in  a house.   He is independent with ADLs and drives at baseline.  He does not use any assistive devices for ambulation. He plans on returning home at discharge.  He denies any HHC, DME, or discharge needs.  He will notify the [facility] should any needs arise.  ADOD pending cardio and endocrine consults.  Mima Bonilla RN [facility]

## 2025-04-16 NOTE — CONSULTS
"Inpatient consult to Cardiology  Consult performed by: Higinio HAYWARD MD  Consult ordered by: Marielle Tyler MD  Reason for consult: elevated troponin .        History Of Present Illness:    Reese Haji is a 39 y.o. male with past medical history of DM1, (A1c 10.3), who presented to [facility] [place]iness, found to have hyperglycemia without DKA.  Cardiology consulted for syncope, elevated troponin.     In ED blood sugar was found to be 500 with normal anion gap, normal kidney function, and normal potassium.  CXR clear.     States he quit checking his blood sugar, and quit taking his insulin about 3 months ago.  He was driving, when he began to feel dizzy, and was seeing black spots. He was also very fatigued.  He actually thought his blood sugar was low, but it was 416 when he checked it. He thought he may passed out, but no syncopal episode.  States he has a pinpoint area to his chest that feels like \"poking a bruise\" every couple of days, not with exertion, but more feels like a muscle spasm.  Denies any shortness of breath.  Denies any arm or leg swelling.  He had an episode this morning where he thought he may vomit due to phlegm in his throat.  NO recent diarrhea.      No home CV medications.   He does not follow with cardiology as outpatient  No family history of early CAD or sudden death  Denies smoking or ETOH use    He works as a manager at [employer]    Past Cardiology Tests (Last 3 Years):    EKG:      Echo: na    Cath: na    Stress Test: na      Past Medical History:  He has a past medical history of Diabetes 1.5, managed as type 1 (Multi).    Past Surgical History:  He has a past surgical history that includes Hernia repair (04/18/2016).      Social History:  He reports that he has never smoked. He has never used smokeless tobacco. He reports current alcohol use. He reports current drug use. Drug: Marijuana.    Family History:  Family History[1]     Allergies:  Patient has no known " "allergies.    ROS:  10 point review of systems including (Constitutional, Eyes, ENMT, Respiratory, Cardiac, Gastrointestinal, Neurological, Psychiatric, and Hematologic) was performed and is otherwise negative.    Objective Data:  Last Recorded Vitals:  Vitals:    25 0038 25 0423 25 0424 25 0425   BP: 135/90 124/72 134/78 145/78   BP Location: Right arm Right arm Right arm Right arm   Patient Position: Lying Lying Lying Lying   Pulse: 75 64 66 70   Resp: 18 18 18 18   Temp: 36 °C (96.8 °F) 36 °C (96.8 °F) 36.3 °C (97.3 °F) 36.3 °C (97.3 °F)   TempSrc: Temporal Temporal Temporal Temporal   SpO2: 99% 98% 97% 96%   Weight:       Height:         Medical Gas Therapy: None (Room air)  Weight  Av.8 kg (165 lb)  Min: 74.8 kg (165 lb)  Max: 74.8 kg (165 lb)    LABS:  CMP:  Results from last 7 days   Lab Units 25  0518 04/15/25  1319   SODIUM mmol/L 142 135*   POTASSIUM mmol/L 3.7 4.3   CHLORIDE mmol/L 110* 102   CO2 mmol/L 25 25   ANION GAP mmol/L 11 12   BUN mg/dL 17 16   CREATININE mg/dL 0.79 1.01   EGFR mL/min/1.73m*2 >90 >90   MAGNESIUM mg/dL 1.78 1.70   ALBUMIN g/dL 3.4 4.1   ALT U/L 17 19   AST U/L 15 14   BILIRUBIN TOTAL mg/dL 0.8 0.7     CBC:  Results from last 7 days   Lab Units 25  0518 04/15/25  1319   WBC AUTO x10*3/uL 9.4 11.7*   HEMOGLOBIN g/dL 13.4* 13.8   HEMATOCRIT % 39.7* 41.3   PLATELETS AUTO x10*3/uL 177 186   MCV fL 90 90     COAG:     ABO: No results found for: \"ABO\"  HEME/ENDO:  Results from last 7 days   Lab Units 04/15/25  1319   HEMOGLOBIN A1C % 10.3*      CARDIAC:   Results from last 7 days   Lab Units 04/15/25  1922 04/15/25  1413 04/15/25  1319   TROPHS ng/L 71* 129* 116*      Results from last 7 days   Lab Units 04/15/25  1319   HEMOGLOBIN A1C % 10.3*        Last I/O:    Intake/Output Summary (Last 24 hours) at 2025 0811  Last data filed at 2025 0313  Gross per 24 hour   Intake 576.66 ml   Output --   Net 576.66 ml     Net IO Since Admission: " 576.66 mL [04/16/25 0811]      Imaging Results:      Inpatient Medications:  Scheduled Medications[2]  PRN Medications[3]  Continuous Medications[4]    Outpatient Medications:  Current Outpatient Medications   Medication Instructions    NovoLIN N NPH U-100 Insulin 10 Units, 2 times daily before meals    NovoLIN R Regular U100 Insulin 6 Units       Physical Exam:    General:  Patient is awake, alert, and oriented.  Patient is in no acute distress.  HEENT:  Normocephalic.  Moist mucosa.    Neck:  Normal Jugular Venous Pressure.  Cardiovascular:  Regular rate and rhythm.  Normal S1 and S2, no murmurs, rubs or gallops  Pulmonary:  Clear to auscultation bilaterally.  Abdomen:  Soft. Non-tender.   Non-distended.  Positive bowel sounds.  Lower Extremities:  2+ pedal pulses. No LE edema.  Neurologic:  Alert and oriented x3. No focal deficit.   Skin: Skin warm and dry, normal skin turgor.   Psychiatric: Normal affect.      Assessment/Plan     Reese Haji is a 39 y.o. male with past medical history of DM1, (A1c 10.3), who [place] [facility] [place] found to have hyperglycemia without DKA.  Cardiology consulted for syncope, elevated troponin.     In ED blood sugar was found to be 500 with normal anion gap, normal kidney function, and normal potassium.  CXR clear.     Assessment:    # DM 1  # Hyperglycemia  # Medication non  adherence    - admit blood sugar 400   - given 2L fluids in ED   - quit taking insulin and checking blood sugar about 3 months ago.    # Elevated troponin.  Low level elevation with a flat trend consistent with a non-MI troponin elevation / chronic non-traumatic myocardial injury in the setting of above. Core measures do not apply.  There are no significant clinical signs / symptoms or ischemic changes consistent with ACS.    Plan:  - We will obtain a transthoracic echocardiogram for structural evaluation including ejection fraction, assessment of regional wall motion abnormalities or valvular  disease, and further evaluation of hemodynamics.  - Recommend home BP monitoring.  Low threshold to initiate ACE / ARB  - Adding lipid panel  - Recommend outpatient nuclear exercise stress test ( ordered )    He can follow up with Dr. Dunaway in office in one month to review BP log and stress test results    If echo results are unremarkable, OK to discharge home from a cardiology standpoint.       Code Status:  Full Code      Nina Arellano, APRN-CNP      Thank you for allowing me to participate in their care.  Please feel free to call me with any further questions or concerns.    Higinio Dunaway MD, FAC, [facility]  Division of Cardiovascular Medicine  System Director, Nuclear Cardiology   Medical Director, [facility] [place], [facility]   Clinical , [facility] [employer]  [email or web address]   Office:  [phone]          Both the SATURNINO and I have had a face to face encounter with the patient today. I have examined the patient and edited the documented physical examination as necessary.  I personally reviewed the patient's vital signs, telemetry, recent labs, medications, orders, EKGs, and pertinent cardiac imaging/ echocardiography.  I have reviewed the SATURNINO's encounter note, approve the SATURNINO's documentation and have edited the note to reflect my diagnostic and therapeutic plan.           [1] No family history on file.  [2]   Scheduled medications   Medication Dose Route Frequency    heparin  5,000 Units subcutaneous q12h    insulin glargine  15 Units subcutaneous Nightly    insulin lispro  4 Units subcutaneous TID AC    polyethylene glycol  17 g oral Daily   [3]   PRN medications   Medication    acetaminophen    Or    acetaminophen    Or    acetaminophen    dextrose    dextrose    glucagon    glucagon    ondansetron    Or    ondansetron   [4]   Continuous Medications   Medication Dose Last  Rate    sodium chloride 0.9%  100 mL/hr 100 mL/hr (04/16/25 0656)

## 2025-04-16 NOTE — H&P
HISTORY AND PHYSICAL EXAMINATION    PATIENT NAME: Reese Haji    MRN: [id number]  SERVICE DATE: 4/15/2025       PRIMARY CARE PHYSICIAN: Kiki Winters MD          ASSESSMENT AND PLAN     DM type I, uncontrolled from lack of medications (started as DM 2 at age 23, suspected to have transformed to DM1 in 2024, based on very low C-peptide levels)  Orthostatic hypotension with dizziness, from hypovolemia  Elevated troponin, uncertain etiology.  Probable stress-induced  Acute Lactic acidosis, resolved with hydration    Plan:  Resume long-acting insulin and Premeal lispro.  Endocrine/cardiology consult.  Telemetry.  Counseled on compliance with medications and appropriate diet.  Diabetic nurse education.  Repeat C-peptide levels.  Check MERCY 65 antibody.    Encourage aggressive hydration and monitor orthostatics.      Discussed with nurses/case management team and the specialists involved in this patient's care. Reviewed the EMR and documentation from other care-givers.    SUBJECTIVE  CHIEF COMPLAINT:  dizziness and high glu    HPI: This is a 39 y.o. male who was brought to ER by EMS from his workplace following dizziness and high blood sugars.  Patient has gradually cut down the NPH insulin as he had symptoms of low sugar, despite having blood sugars in the 150s.  Today, while driving to work, he had symptoms that he resumed to be low sugar symptoms.  When he reached work, his blood sugars were more than 400.  He had dizziness and thus had his coworker called EMS.  High blood sugars were confirmed again in the ER.  Patient was given aggressive hydration.  Labs revealed elevated troponin and high lactic acid.  He was not in DKA.  Patient was admitted for further evaluation and management.  After almost 2 L of IV fluids in the ER, repeat orthostatics still remain abnormal.    PAST MEDICAL HISTORY:   Past Medical History:   Diagnosis Date    Diabetes 1.5, managed as type 1 (Multi)      PAST SURGICAL HISTORY:   Past  Surgical History:   Procedure Laterality Date    HERNIA REPAIR  04/18/2016    Inguinal Hernia Repair     FAMILY HISTORY: +DM  SOCIAL HISTORY:   Social History     Tobacco Use    Smoking status: Never    Smokeless tobacco: Never   Substance Use Topics    Alcohol use: Yes     Comment: socially    Drug use: Yes     Types: Marijuana       MEDICATIONS: Prior to Admission Medications  Medications Prior to Admission   Medication Sig Dispense Refill Last Dose/Taking    NovoLIN N NPH U-100 Insulin 100 unit/mL injection Inject 10 Units under the skin 2 times a day before meals. (Patient not taking: Reported on 4/15/2025)   More than a month    NovoLIN R Regular U100 Insulin 100 unit/mL injection 6 Units. (Patient not taking: Reported on 4/15/2025)   More than a month      CURRENT ALLERGIES: No Known Allergies    COMPLETE REVIEW OF SYSTEMS:      GENERAL: No fever, appetite stable.  HEENT: No epistaxis, no mouth ulcers  NECK: no neck pain  RESPIRATORY: No new resp symptoms.  CARDIOVASCULAR: No cp, no leg edema, No orthopnea  GI: No NVD, no GI Bleed  : No hematuria, no dysuria  MUSCULOSKELETAL: No new jt pains or swelling  SKIN: No rashes, no ulcers  PSYCH: Denies feeling anxious or depressed.   HEMATOLOGY/LYMPHOLOGY: No bruising, no hx of VTE  ENDOCRINE: +hx of DM  NEURO: No hx of seizures, No hx of CVA      OBJECTIVE  PHYSICAL EXAM:       4/15/2025     6:45 PM 4/15/2025     7:00 PM 4/15/2025     7:15 PM 4/15/2025     7:30 PM 4/15/2025     8:04 PM 4/15/2025     8:20 PM 4/15/2025     8:21 PM   Vitals   Systolic 140 138 131 134 150 144 127   Diastolic 84 84 75 97 89 95 92   BP Location     Right arm Right arm Right arm   Heart Rate 68 68 72 76 68 75 81   Temp     36.2 °C (97.2 °F) 36 °C (96.8 °F) 36.1 °C (97 °F)   Resp 15 20 18 18 18 18 18     Body mass index is 23.68 kg/m².    GENERAL: awake, alert, Ox3, cooperative resting comfortably  SKIN: Skin turgor normal. No rashes  HEENT: EOMI, no epistaxis, Moist mucosa.  LUNGS: Bilat  breath sounds, with no added sounds  CARDIAC: REGULAR. no rubs, no murmur  ABDOMEN: soft, non-tender. +BS.  EXTREMITIES: No edema, Good capillary refill.   NEURO: Insight GOOD. No invol movements.   MUSCULOSKELETAL: No acute inflammation       .  Current Facility-Administered Medications:     acetaminophen (Tylenol) tablet 650 mg, 650 mg, oral, q4h PRN **OR** acetaminophen (Tylenol) suspension 650 mg, 650 mg, oral, q4h PRN **OR** acetaminophen (Tylenol) suppository 650 mg, 650 mg, rectal, q4h PRN, Marielle Tyler MD    dextrose 50 % injection 12.5 g, 12.5 g, intravenous, q15 min PRN, Marielle Tyler MD    dextrose 50 % injection 25 g, 25 g, intravenous, q15 min PRN, Marielle Tyler MD    glucagon (Glucagen) injection 1 mg, 1 mg, intramuscular, q15 min PRN, Marielle Tyler MD    glucagon (Glucagen) injection 1 mg, 1 mg, intramuscular, q15 min PRN, Marielle Tyler MD    heparin (porcine) injection 5,000 Units, 5,000 Units, subcutaneous, q12h, Marielle Tyler MD    insulin glargine (Lantus) injection 15 Units, 15 Units, subcutaneous, Nightly, Marielle Tyler MD    insulin lispro injection 4 Units, 4 Units, subcutaneous, TID AC, Marielle Tyler MD    ondansetron (Zofran) tablet 4 mg, 4 mg, oral, q8h PRN **OR** ondansetron (Zofran) injection 4 mg, 4 mg, intravenous, q8h PRN, Marielle Tyler MD    polyethylene glycol (Glycolax, Miralax) packet 17 g, 17 g, oral, Daily, Marielle Tyler MD    sodium chloride 0.9% infusion, 100 mL/hr, intravenous, Continuous, Marielle Tyler MD    DATA:   Diagnostic tests reviewed for today's visit:    Most recent labs  Results from last 7 days   Lab Units 04/15/25  1319   WBC AUTO x10*3/uL 11.7*   HEMOGLOBIN g/dL 13.8   HEMATOCRIT % 41.3   PLATELETS AUTO x10*3/uL 186     Results from last 7 days   Lab Units 04/15/25  1319   SODIUM mmol/L 135*   POTASSIUM mmol/L 4.3   CHLORIDE mmol/L 102   CO2 mmol/L 25   BUN mg/dL 16   CREATININE mg/dL 1.01    CALCIUM mg/dL 8.8   PROTEIN TOTAL g/dL 6.7   BILIRUBIN TOTAL mg/dL 0.7   ALK PHOS U/L 76   ALT U/L 19   AST U/L 14   GLUCOSE mg/dL 470*       Hstroponin -116, 129, 71  Lactic acid 2.8, 2.2, 1      Results from last 7 days   Lab Units 04/15/25  2003 04/15/25  1720 04/15/25  1425 04/15/25  1312   POCT GLUCOSE mg/dL 194* 265* 355* 431*          XR chest 1 view   Final Result   1.  No evidence of acute cardiopulmonary process.                  MACRO:   None        Signed by: Dmitry Alberto 4/15/2025 4:53 PM   Dictation workstation:   [id number]        EKG: SR      SIGNATURE: Marielle Tyler MD  DATE: April 15, 2025  TIME: 9:25 PM

## 2025-04-16 NOTE — CONSULTS
"Inpatient consult to Endocrinology  Consult performed by: Haile Rossi MD  Consult ordered by: Marielle Tyler MD      Reason For Consult  Diabetes    History Of Present Illness  Reese Haji is a 39 y.o. male     Duration of diabetes mellitus:  16 years  Redefined as type 1 diabetes 1 year ago  Complications:  none    Home regimen:  Novolin N  Novolin R    He has not taken insulin for months  History of hypoglycemia on insulin    History of prior admission to [facility] last summer  He was unable to afford insulin analogs due to lack of insurance at that time.     Medications  Current Medications[1]     Past Medical History  He has a past medical history of Diabetes 1.5, managed as type 1 (Multi).    Surgical History  He has a past surgical history that includes Hernia repair (04/18/2016).     Social History  He reports that he has never smoked. He has never used smokeless tobacco. He reports current alcohol use. He reports current drug use. Drug: Marijuana.    Family History  Family History[2]    Allergies  Patient has no known allergies.    Review of Systems   Constitutional:  Negative for unexpected weight change.   Eyes:  Negative for visual disturbance.   Respiratory:  Negative for shortness of breath.    Cardiovascular:  Positive for chest pain.   Endocrine:        As above         Last Recorded Vitals  Blood pressure 145/78, pulse 70, temperature 36.3 °C (97.3 °F), temperature source Temporal, resp. rate 18, height 1.778 m (5' 10\"), weight 74.8 kg (165 lb), SpO2 96%.    Physical Exam  Constitutional:       General: He is not in acute distress.  HENT:      Head: Normocephalic.      Mouth/Throat:      Mouth: Mucous membranes are moist.   Eyes:      Extraocular Movements: Extraocular movements intact.   Neck:      Thyroid: No thyromegaly.   Cardiovascular:      Pulses:           Carotid pulses are 2+ on the right side and 2+ on the left side.  Abdominal:      Tenderness: There is no abdominal " tenderness.   Musculoskeletal:      Right lower leg: No edema.      Left lower leg: No edema.   Neurological:      Mental Status: He is alert.          Relevant Results   Latest Reference Range & Units 04/15/25 13:19 04/15/25 14:13 04/15/25 19:22 04/16/25 05:18   GLUCOSE 74 - 99 mg/dL 470 (HH)   203 (H)   SODIUM 136 - 145 mmol/L 135 (L)   142   POTASSIUM 3.5 - 5.3 mmol/L 4.3   3.7   CHLORIDE 98 - 107 mmol/L 102   110 (H)   Bicarbonate 21 - 32 mmol/L 25   25   Anion Gap 10 - 20 mmol/L 12   11   Blood Urea Nitrogen 6 - 23 mg/dL 16   17   Creatinine 0.50 - 1.30 mg/dL 1.01   0.79   EGFR >60 mL/min/1.73m*2 >90   >90   Calcium 8.6 - 10.3 mg/dL 8.8   8.5 (L)   PHOSPHORUS 2.5 - 4.9 mg/dL 3.7      Albumin 3.4 - 5.0 g/dL 4.1   3.4   Alkaline Phosphatase 33 - 120 U/L 76   60   ALT 10 - 52 U/L 19   17   AST 9 - 39 U/L 14   15   Bilirubin Total 0.0 - 1.2 mg/dL 0.7   0.8   Bilirubin, Direct 0.0 - 0.3 mg/dL 0.1      Total Protein 6.4 - 8.2 g/dL 6.7   5.6 (L)   Osmolality, Serum 280 - 300 mOsm/kg 305 (H)      MAGNESIUM 1.60 - 2.40 mg/dL 1.70   1.78   Lactate 0.4 - 2.0 mmol/L 2.8 (H) 2.2 (H) 1.0    Troponin I, High Sensitivity 0 - 20 ng/L 116 (HH) 129 (HH) 71 (HH)    Hemoglobin A1C See comment % 10.3 (H)      Estimated Average Glucose Not Established mg/dL 249      Beta-Hydroxybutyrate 0.02 - 0.27 mmol/L 0.18            IMPRESSION  TYPE 1 DIABETES MELLITUS WITH HYPERGLYCEMIA  Symptomatic hyperglycemia on presentation, without ketoacidosis  A1c reflects chronic hyperglycemia    RECOMMENDATIONS  Continue glargine 15 units at bedtime for now  Lispro scale [facility]  Diabetes education      Haile Rossi MD         [1]   Current Facility-Administered Medications:     acetaminophen (Tylenol) tablet 650 mg, 650 mg, oral, q4h PRN **OR** acetaminophen (Tylenol) suspension 650 mg, 650 mg, oral, q4h PRN **OR** acetaminophen (Tylenol) suppository 650 mg, 650 mg, rectal, q4h PRN, Marielle Tyler MD    dextrose 50 % injection 12.5 g, 12.5 g,  intravenous, q15 min PRN, Marielle Tyler MD    dextrose 50 % injection 25 g, 25 g, intravenous, q15 min PRN, Marielle Tyler MD    glucagon (Glucagen) injection 1 mg, 1 mg, intramuscular, q15 min PRN, Marielle Tyler MD    glucagon (Glucagen) injection 1 mg, 1 mg, intramuscular, q15 min PRN, Marielle Tyler MD    heparin (porcine) injection 5,000 Units, 5,000 Units, subcutaneous, q12h, Marielle Tyler MD, 5,000 Units at 04/15/25 2125    insulin glargine (Lantus) injection 15 Units, 15 Units, subcutaneous, Nightly, Marielle Tyler MD, 15 Units at 04/15/25 2126    insulin lispro injection 4 Units, 4 Units, subcutaneous, TID AC, Marielle Tyler MD, 4 Units at 04/15/25 2126    ondansetron (Zofran) tablet 4 mg, 4 mg, oral, q8h PRN **OR** ondansetron (Zofran) injection 4 mg, 4 mg, intravenous, q8h PRN, Marielle Tyler MD    polyethylene glycol (Glycolax, Miralax) packet 17 g, 17 g, oral, Daily, Marielle Tyler MD    sodium chloride 0.9% infusion, 100 mL/hr, intravenous, Continuous, Marielle Tyler MD, Last Rate: 100 mL/hr at 04/16/25 0656, 100 mL/hr at 04/16/25 0656  [2] No family history on file.

## 2025-04-16 NOTE — CARE PLAN
The patient's goals for the shift include  blood sugar within normal limits    The clinical goals for the shift include maintain safety and no fall      Problem: Chronic Conditions and Co-morbidities  Goal: Patient's chronic conditions and co-morbidity symptoms are monitored and maintained or improved  Outcome: Not Progressing

## 2025-04-16 NOTE — ED PROVIDER NOTES
HPI   Chief Complaint   Patient presents with    Dizziness    Hyperglycemia       HPI: []  39-year-old male history of insulin-dependent diabetes mellitus compliance uncertain because he is down to been off himself insulin comes in with dizziness.  He states that this morning he was going to work became dizzy lightheaded with tunnel vision almost passed out went to work blood sugar was high called his boss and he was told to go to the ED for evaluation.  he called EMS.  He denies any significant chest pain pressure and heaviness.  He denies any associated nausea vomit diarrhea fever chills cough congestion incontinence seizures headache vision changes syncope or near syncope.  He does state he has an intermittent sharp shooting chest pain which comes and goes only for a few seconds.  Currently is pain-free.    Past history: Insulin dependent diabetes mellitus  Social: Denies current tobacco alcohol drug abuse.  REVIEW OF SYSTEMS:    GENERAL.: No weight loss, fatigue, anorexia, insomnia, fever.  Positive for dizziness    EYES: No vision loss, double vision, drainage, eye pain.    ENT: No pharyngitis, dry mouth.    CARDIOPULMONARY: Positive for intermittent sharp shooting chest pain, palpitations, syncope, near syncope. No shortness of breath, cough, hemoptysis.    GI: No abdominal pain, change in bowel habits, melena, hematemesis, hematochezia, nausea, vomiting, diarrhea.    : No discharge, dysuria, frequency, urgency, hematuria.    MS: No limb pain, joint pain, joint swelling.    SKIN: No rashes.    PSYCH: No depression, anxiety, suicidality, homicidality.    Review of systems is otherwise negative unless stated above or in history of present illness.  Social history, family history, allergies reviewed.  PHYSICAL EXAM:    GENERAL: Vitals noted, no distress. Alert and oriented  x 3. Non-toxic.      EENT: TMs clear. Posterior oropharynx unremarkable. No meningismus. No LAD.     NECK: Supple. Nontender. No midline  tenderness.     CARDIAC: Regular, rate, rhythm. No murmurs rubs or gallops. No JVD    PULMONARY: Lungs clear bilaterally with good aeration. No wheezes rales or rhonchi. No respiratory distress.     ABDOMEN: Soft, nonsurgical. Nontender. No peritoneal signs. Normoactive bowel sounds. No pulsatile masses.  Negative for CVA tenderness.    EXTREMITIES: No peripheral edema. Negative Homans bilaterally, no cords.  2+ bounding pulses well-perfused.    SKIN: No rash. Intact.     NEURO: No focal neurologic deficits, NIH score of 0. Cranial nerves normal as tested from II through XII.     MEDICAL DECISION MAKING:  EKG on my interpretation shows a normal sinus rhythm normal axis rate mid 80s with no acute ischemic change.  CBC with differential shows my leukocytosis 11 .4 stable hemoglobin, chemistry show high blood sugar close to 500, but his sodium bicarb and anion gap is normal his kidney function is normal potassium is normal venous fluid panel shows normal pH, normal sodium bicarbonate, elevated lactate.  Chest x-ray negative.  Troponin is elevated repeat downtrending.    Treatment in the ED: IV established given IV fluids 2 L normal saline.    ED course: On repeat evaluation with unremarkable remains afebrile normotensive no tachycardia or hypoxia repeat troponins are downtrending.  Repeat blood sugar down to 265 repeat lactate is improving.    Impression: #1 hyperglycemia with no DKA, #2 demand ischemia myocardium  Plan/MDM: 39-year-old male with a history of insulin-dependent diabetes mellitus noncompliant comes in with what appears to be profound hyperglycemia, with no evidence of diabetic ketoacidosis sepsis or septic shock, his EKG is unremarkable troponin elevation is mostly a demand ischemia due to metabolic derangements and stress of hyperglycemia clinically I do not suspect he has STEMI or dissection or ACS or pulm embolism.  Patient will be hospital for hydration symptom control glycemic control trend  troponins and inpatient endocrine and cardiology consultations.              Patient History   Past Medical History:   Diagnosis Date    Diabetes 1.5, managed as type 1 (Multi)      Past Surgical History:   Procedure Laterality Date    HERNIA REPAIR  04/18/2016    Inguinal Hernia Repair     No family history on file.  Social History     Tobacco Use    Smoking status: Never    Smokeless tobacco: Never   Substance Use Topics    Alcohol use: Yes     Comment: socially    Drug use: Yes     Types: Marijuana       Physical Exam   ED Triage Vitals   Temp Heart Rate Resp BP   04/15/25 1315 04/15/25 1315 04/15/25 1315 04/15/25 1315   37 °C (98.6 °F) 95 13 (!) 152/93      SpO2 Temp Source Heart Rate Source Patient Position   04/15/25 1315 04/15/25 1315 04/15/25 1315 04/15/25 1315   98 % Temporal Monitor Sitting      BP Location FiO2 (%)     04/15/25 1315 --     Left arm        Physical Exam      ED Course & MDM   ED Course as of 04/15/25 2245   Tue Apr 15, 2025   1752 Troponin I, High Sensitivity(!!): 129 [PV]   1804 Troponin I, High Sensitivity(!!): 129 [PV]   1805 EKG has no ischemic change.  Troponin x 2 elevated but downtrending blood sugar elevated but downtrending, most of the bicarbonate and gap is normal, patient will be hospitalized for further care. [MT]      ED Course User Index  [MT] Agustina Heath MD  [PV] Marcellus Sylvester MD         Diagnoses as of 04/15/25 2245   NSTEMI (non-ST elevated myocardial infarction) (Multi)   Hyperglycemia                 No data recorded     Sylva Coma Scale Score: 15 (04/15/25 2100 : Gia Garrett RN)                           Medical Decision Making      Procedure  Critical Care    Performed by: Agustina Heath MD  Authorized by: Agustina Heath MD    Critical care provider statement:     Critical care time (minutes):  45    Critical care time was exclusive of:  Separately billable procedures and treating other patients    Critical care was necessary to treat or  prevent imminent or life-threatening deterioration of the following conditions:  Metabolic crisis and cardiac failure (elevated troponins)    Critical care was time spent personally by me on the following activities:  Blood draw for specimens, development of treatment plan with patient or surrogate, discussions with primary provider, evaluation of patient's response to treatment, examination of patient, ordering and performing treatments and interventions, ordering and review of laboratory studies, ordering and review of radiographic studies, pulse oximetry, re-evaluation of patient's condition, review of old charts and obtaining history from patient or surrogate    Care discussed with: admitting provider         Agustina Heath MD  04/15/25 5040

## 2025-04-17 VITALS
BODY MASS INDEX: 23.62 KG/M2 | RESPIRATION RATE: 17 BRPM | DIASTOLIC BLOOD PRESSURE: 87 MMHG | WEIGHT: 165 LBS | HEART RATE: 60 BPM | HEIGHT: 70 IN | TEMPERATURE: 96.3 F | SYSTOLIC BLOOD PRESSURE: 144 MMHG | OXYGEN SATURATION: 99 %

## 2025-04-17 LAB
ALBUMIN SERPL BCP-MCNC: 3.3 G/DL (ref 3.4–5)
ALP SERPL-CCNC: 53 U/L (ref 33–120)
ALT SERPL W P-5'-P-CCNC: 16 U/L (ref 10–52)
ANION GAP SERPL CALC-SCNC: 10 MMOL/L (ref 10–20)
AST SERPL W P-5'-P-CCNC: 14 U/L (ref 9–39)
BILIRUB SERPL-MCNC: 0.5 MG/DL (ref 0–1.2)
BUN SERPL-MCNC: 14 MG/DL (ref 6–23)
CALCIUM SERPL-MCNC: 8.1 MG/DL (ref 8.6–10.3)
CHLORIDE SERPL-SCNC: 110 MMOL/L (ref 98–107)
CO2 SERPL-SCNC: 26 MMOL/L (ref 21–32)
CREAT SERPL-MCNC: 0.78 MG/DL (ref 0.5–1.3)
EGFRCR SERPLBLD CKD-EPI 2021: >90 ML/MIN/1.73M*2
ERYTHROCYTE [DISTWIDTH] IN BLOOD BY AUTOMATED COUNT: 12.2 % (ref 11.5–14.5)
GLUCOSE BLD MANUAL STRIP-MCNC: 85 MG/DL (ref 74–99)
GLUCOSE SERPL-MCNC: 153 MG/DL (ref 74–99)
HCT VFR BLD AUTO: 39.1 % (ref 41–52)
HGB BLD-MCNC: 13.1 G/DL (ref 13.5–17.5)
MAGNESIUM SERPL-MCNC: 1.65 MG/DL (ref 1.6–2.4)
MCH RBC QN AUTO: 30.4 PG (ref 26–34)
MCHC RBC AUTO-ENTMCNC: 33.5 G/DL (ref 32–36)
MCV RBC AUTO: 91 FL (ref 80–100)
NRBC BLD-RTO: 0 /100 WBCS (ref 0–0)
PLATELET # BLD AUTO: 168 X10*3/UL (ref 150–450)
POTASSIUM SERPL-SCNC: 3.8 MMOL/L (ref 3.5–5.3)
PROT SERPL-MCNC: 5.1 G/DL (ref 6.4–8.2)
RBC # BLD AUTO: 4.31 X10*6/UL (ref 4.5–5.9)
SODIUM SERPL-SCNC: 142 MMOL/L (ref 136–145)
WBC # BLD AUTO: 7.6 X10*3/UL (ref 4.4–11.3)

## 2025-04-17 PROCEDURE — 36415 COLL VENOUS BLD VENIPUNCTURE: CPT | Performed by: INTERNAL MEDICINE

## 2025-04-17 PROCEDURE — 83735 ASSAY OF MAGNESIUM: CPT | Performed by: INTERNAL MEDICINE

## 2025-04-17 PROCEDURE — 80053 COMPREHEN METABOLIC PANEL: CPT | Performed by: INTERNAL MEDICINE

## 2025-04-17 PROCEDURE — 85027 COMPLETE CBC AUTOMATED: CPT | Performed by: INTERNAL MEDICINE

## 2025-04-17 PROCEDURE — 99231 SBSQ HOSP IP/OBS SF/LOW 25: CPT | Performed by: INTERNAL MEDICINE

## 2025-04-17 PROCEDURE — 2500000004 HC RX 250 GENERAL PHARMACY W/ HCPCS (ALT 636 FOR OP/ED): Mod: JZ | Performed by: INTERNAL MEDICINE

## 2025-04-17 PROCEDURE — 82947 ASSAY GLUCOSE BLOOD QUANT: CPT

## 2025-04-17 RX ORDER — INSULIN LISPRO 100 [IU]/ML
2 INJECTION, SOLUTION INTRAVENOUS; SUBCUTANEOUS
Status: DISCONTINUED | OUTPATIENT
Start: 2025-04-17 | End: 2025-04-17 | Stop reason: HOSPADM

## 2025-04-17 RX ORDER — INSULIN GLARGINE 100 [IU]/ML
10 INJECTION, SOLUTION SUBCUTANEOUS NIGHTLY
Qty: 3 ML | Refills: 3 | Status: SHIPPED | OUTPATIENT
Start: 2025-04-17 | End: 2025-06-16

## 2025-04-17 RX ORDER — PEN NEEDLE, DIABETIC 30 GX3/16"
NEEDLE, DISPOSABLE MISCELLANEOUS
Qty: 300 EACH | Refills: 11 | Status: SHIPPED | OUTPATIENT
Start: 2025-04-17

## 2025-04-17 RX ORDER — INSULIN LISPRO 100 [IU]/ML
INJECTION, SOLUTION INTRAVENOUS; SUBCUTANEOUS
Qty: 15 ML | Refills: 1 | Status: SHIPPED | OUTPATIENT
Start: 2025-04-17 | End: 2025-04-17

## 2025-04-17 RX ORDER — INSULIN GLARGINE 100 [IU]/ML
10 INJECTION, SOLUTION SUBCUTANEOUS NIGHTLY
Status: DISCONTINUED | OUTPATIENT
Start: 2025-04-17 | End: 2025-04-17 | Stop reason: HOSPADM

## 2025-04-17 RX ORDER — INSULIN LISPRO 100 [IU]/ML
0-5 INJECTION, SOLUTION INTRAVENOUS; SUBCUTANEOUS
Status: DISCONTINUED | OUTPATIENT
Start: 2025-04-17 | End: 2025-04-17 | Stop reason: HOSPADM

## 2025-04-17 RX ORDER — BLOOD-GLUCOSE SENSOR
EACH MISCELLANEOUS
Qty: 2 EACH | Refills: 3 | Status: SHIPPED | OUTPATIENT
Start: 2025-04-17

## 2025-04-17 RX ORDER — ACETAMINOPHEN 325 MG/1
650 TABLET ORAL EVERY 4 HOURS PRN
Start: 2025-04-17

## 2025-04-17 RX ORDER — INSULIN LISPRO 100 [IU]/ML
INJECTION, SOLUTION INTRAVENOUS; SUBCUTANEOUS
Qty: 15 ML | Refills: 1 | Status: SHIPPED | OUTPATIENT
Start: 2025-04-17

## 2025-04-17 RX ADMIN — SODIUM CHLORIDE 100 ML/HR: 0.9 INJECTION, SOLUTION INTRAVENOUS at 03:33

## 2025-04-17 ASSESSMENT — PAIN - FUNCTIONAL ASSESSMENT: PAIN_FUNCTIONAL_ASSESSMENT: 0-10

## 2025-04-17 ASSESSMENT — PAIN SCALES - GENERAL: PAINLEVEL_OUTOF10: 0 - NO PAIN

## 2025-04-17 NOTE — PROGRESS NOTES
PROGRESS NOTE - INTERNAL MEDICINE     PATIENT NAME:  Reese Haji    MRN:  [id number]  SERVICE DATE:  4/17/2025       ADMITTING PHYSICIAN:  Marielle Tyler MD    ASSESSMENT AND PLAN    DM type I, uncontrolled from lack of medications (started as DM 2 at age 23, suspected to have transformed to DM1 in 2024, based on very low C-peptide levels). C peptide 1.8, A1c 10.3  this adm.  Orthostatic hypotension with dizziness, from hypovolemia; resolved with hydration today  Elevated troponin, uncertain etiology.  Probable stress-induced. Echo ok.   Acute Lactic acidosis, resolved with hydration     Plan:  Lantus, AC lispro and SSI per endocrine recomm.  Endocrine/cardiology consult.  Counseled on compliance with medications and appropriate diet.  Diabetic nurse education.  Encourage aggressive hydration and monitor orthostatics.        Discharge is planned for today to home. Hospital course , medication changes and Investigation's conducted were reviewed with the patient / Family. Activity, Diet and Medications after discharge were reviewed with the patient / Family. Medication reconciliation done - pls refer to medication reconciliation sheet.Follow up with Primary Care Physician were reviewed with the patient / Family. Discharge planning was discussed with staff. Refer to discharge orders sheet. Total time to coordinate disch process incl counseling family, coordinating with other care givers,  and pharmacist was >35 min.        INTERVAL HISTORY OF PRESENT ILLNESS:  nausea and dry heaving today afternoon. No orthostatic dizziness. No chest pain/sob. No n/v/d. Oral intake good. Feeling better. No fever/chills. acute events from last 24 hrs reviewed.     Pertinent ROS:  No abdominal pain / No Bleeding / No rashes     Discussed with nursing and case management team and the specialists involved in this patient's care. Reviewed the EMR and documentation from other care-givers.        OBJECTIVE  PHYSICAL EXAM:       GENERAL: AAOx3, cooperative resting comfortably  SKIN: Skin turgor normal. No rashes  HEENT: no epistaxis, Moist mucosa.  LUNGS: Bilat breath sounds, with no added sounds  CARDIAC: REGULAR. no rubs, no murmur  ABDOMEN: soft, non-tender. +BS.  EXTREMITIES: No edema, Good capillary refill.   NEURO: Insight GOOD. No invol movements.   MUSCULOSKELETAL: No acute inflammation            4/16/2025     4:33 PM 4/16/2025     4:34 PM 4/16/2025     4:35 PM 4/16/2025     8:24 PM 4/17/2025    12:26 AM 4/17/2025     4:42 AM 4/17/2025     8:35 AM   Vitals   Systolic 126 129 122 131 126 134 144   Diastolic 74 85 82 75 87 77 87   BP Location Right arm Right arm Right arm Right arm Right arm Right arm Right arm   Heart Rate 57 67 78 66 67 58 60   Temp    36.7 °C (98.1 °F) 37.2 °C (99 °F) 36.3 °C (97.3 °F) 35.7 °C (96.3 °F)   Resp    18 18 18 17     Body mass index is 23.68 kg/m².    Intake/Output Summary (Last 24 hours) at 4/17/2025 0941  Last data filed at 4/17/2025 0904  Gross per 24 hour   Intake 1551.66 ml   Output --   Net 1551.66 ml         Current Medications[1]    DATA:   Diagnostic tests reviewed for today's visit:    Most recent labs  Results from last 7 days   Lab Units 04/17/25  0436 04/16/25  0518 04/15/25  1319   WBC AUTO x10*3/uL 7.6 9.4 11.7*   HEMOGLOBIN g/dL 13.1* 13.4* 13.8   HEMATOCRIT % 39.1* 39.7* 41.3   PLATELETS AUTO x10*3/uL 168 177 186     Results from last 7 days   Lab Units 04/17/25  0436 04/16/25  0518 04/15/25  1319   SODIUM mmol/L 142 142 135*   POTASSIUM mmol/L 3.8 3.7 4.3   CHLORIDE mmol/L 110* 110* 102   CO2 mmol/L 26 25 25   BUN mg/dL 14 17 16   CREATININE mg/dL 0.78 0.79 1.01   CALCIUM mg/dL 8.1* 8.5* 8.8   PROTEIN TOTAL g/dL 5.1* 5.6* 6.7   BILIRUBIN TOTAL mg/dL 0.5 0.8 0.7   ALK PHOS U/L 53 60 76   ALT U/L 16 17 19   AST U/L 14 15 14   GLUCOSE mg/dL 153* 203* 470*             Results from last 7 days   Lab Units 04/17/25  0806 04/16/25  2026 04/16/25  1602 04/16/25  1156 04/16/25  0803  04/15/25  2003 04/15/25  1720 04/15/25  1425 04/15/25  1312   POCT GLUCOSE mg/dL 85 101* 112* 161* 173* 194* 265* 355* 431*        Transthoracic Echo (TTE) Complete   Final Result      XR chest 1 view   Final Result   1.  No evidence of acute cardiopulmonary process.                  MACRO:   None        Signed by: Dmitry Alberto 4/15/2025 4:53 PM   Dictation workstation:   [id number]      Nuclear Stress Test    (Results Pending)      Latest Reference Range & Units 04/16/25 05:18   HDL CHOLESTEROL mg/dL 42.8   Cholesterol/HDL Ratio  3.4   LDL Calculated <=99 mg/dL 80   VLDL 0 - 40 mg/dL 23   TRIGLYCERIDES 0 - 149 mg/dL 115   Non HDL Cholesterol 0 - 149 mg/dL 103   Total Protein 6.4 - 8.2 g/dL 5.6 (L)   MAGNESIUM 1.60 - 2.40 mg/dL 1.78   CHOLESTEROL 0 - 199 mg/dL 146   (L): Data is abnormally low    SIGNATURE: Marielle Tyler MD PATIENT NAME: Reese Haji   DATE: 4/17/2025 MRN: [id number]   TIME: 9:41 AM             [1]   Current Facility-Administered Medications:     acetaminophen (Tylenol) tablet 650 mg, 650 mg, oral, q4h PRN, 650 mg at 04/16/25 1705 **OR** acetaminophen (Tylenol) suspension 650 mg, 650 mg, oral, q4h PRN **OR** acetaminophen (Tylenol) suppository 650 mg, 650 mg, rectal, q4h PRN, Marielle Tyler MD    dextrose 50 % injection 12.5 g, 12.5 g, intravenous, q15 min PRN, Marielle Tyler MD    dextrose 50 % injection 25 g, 25 g, intravenous, q15 min PRN, Marielle Tyler MD    glucagon (Glucagen) injection 1 mg, 1 mg, intramuscular, q15 min PRN, Marielle Tyler MD    glucagon (Glucagen) injection 1 mg, 1 mg, intramuscular, q15 min PRN, Marielle Tyler MD    heparin (porcine) injection 5,000 Units, 5,000 Units, subcutaneous, q12h, Marielle Tyler MD, 5,000 Units at 04/16/25 2115    insulin glargine (Lantus) injection 10 Units, 10 Units, subcutaneous, Nightly, Haile Rossi MD    insulin lispro injection 0-5 Units, 0-5 Units, subcutaneous, TID, Haile Rossi MD     insulin lispro injection 2 Units, 2 Units, subcutaneous, TID AC, Haile Rossi MD    ondansetron (Zofran) tablet 4 mg, 4 mg, oral, q8h PRN, 4 mg at 04/16/25 1218 **OR** ondansetron (Zofran) injection 4 mg, 4 mg, intravenous, q8h PRN, Marielle Tyler MD    polyethylene glycol (Glycolax, Miralax) packet 17 g, 17 g, oral, Daily, Marielle Tyler MD    sodium chloride 0.9% infusion, 100 mL/hr, intravenous, Continuous, Marielle Tyler MD, Last Rate: 100 mL/hr at 04/17/25 0904, 100 mL/hr at 04/17/25 0904

## 2025-04-17 NOTE — PROGRESS NOTES
"Reese Haji is a 39 y.o. male on day 2 of admission presenting with NSTEMI (non-ST elevated myocardial infarction) (Multi).    Subjective   No new complaint     Scheduled medications  Scheduled Medications[1]  Continuous medications  Continuous Medications[2]  PRN medications  PRN Medications[3]      Objective   Physical Exam  Constitutional:       General: He is not in acute distress.  Neurological:      Mental Status: He is alert.         Last Recorded Vitals  Blood pressure 134/77, pulse 58, temperature 36.3 °C (97.3 °F), temperature source Temporal, resp. rate 18, height 1.778 m (5' 10\"), weight 74.8 kg (165 lb), SpO2 98%.  Intake/Output last 3 Shifts:  I/O last 3 completed shifts:  In: 1865 (24.9 mL/kg) [I.V.:1865 (24.9 mL/kg)]  Out: - (0 mL/kg)   Weight: 74.8 kg     Relevant Results  Results from last 7 days   Lab Units 04/17/25  0806 04/17/25  0436 04/16/25  2026 04/16/25  1602 04/16/25  1156 04/16/25  0803 04/16/25  0518 04/15/25  1425 04/15/25  1319   POCT GLUCOSE mg/dL 85  --  101* 112* 161* 173*  --    < >  --    GLUCOSE mg/dL  --  153*  --   --   --   --  203*  --  470*    < > = values in this interval not displayed.       Assessment & Plan  NSTEMI (non-ST elevated myocardial infarction) (Multi)    IMPRESSION  TYPE 1 DIABETES MELLITUS WITH HYPERGLYCEMIA  Symptomatic hyperglycemia on presentation, without ketoacidosis  A1c reflects chronic hyperglycemia  Glucose much improved     RECOMMENDATIONS  Continue glargine 10 units at bedtime at discharge  Lispro 2 units QAC plus scale, 1 unit per 50 over glucose 101 mg/dl  Appreciate diabetes education  Diabetes education    Haile Rossi MD         [1] heparin, 5,000 Units, subcutaneous, q12h  insulin glargine, 15 Units, subcutaneous, Nightly  insulin lispro, 4 Units, subcutaneous, TID AC  polyethylene glycol, 17 g, oral, Daily  [2] sodium chloride 0.9%, 100 mL/hr, Last Rate: 100 mL/hr (04/17/25 0626)  [3] PRN medications: acetaminophen **OR** " acetaminophen **OR** acetaminophen, dextrose, dextrose, glucagon, glucagon, ondansetron **OR** ondansetron

## 2025-04-17 NOTE — CARE PLAN
The patient's goals for the shift include  get better    The clinical goals for the shift include Pt remain HDS and free from injury    Problem: Chronic Conditions and Co-morbidities  Goal: Patient's chronic conditions and co-morbidity symptoms are monitored and maintained or improved  Outcome: Not Progressing

## 2025-04-17 NOTE — CARE PLAN
The patient's goals for the shift include      The clinical goals for the shift include Blood glucose control throughout this shift    Problem: Safety - Adult  Goal: Free from fall injury  Outcome: Progressing     Problem: Pain - Adult  Goal: Verbalizes/displays adequate comfort level or baseline comfort level  Outcome: Progressing

## 2025-04-18 ENCOUNTER — PATIENT OUTREACH (OUTPATIENT)
Dept: PRIMARY CARE | Facility: CLINIC | Age: 40
End: 2025-04-18
Payer: COMMERCIAL

## 2025-04-18 LAB — GAD65 AB SER IA-ACNC: <5 IU/ML (ref 0–5)

## 2025-04-18 NOTE — PROGRESS NOTES
Discharge Facility: [facility]  Discharge Diagnosis: NSTEMI  Admission Date: 4/15/25  Discharge Date: 4/17/25    PCP Appointment Date: No contact made. Task to office.   Specialist Appointment Date: Unknown  Hospital Encounter and Summary Linked: Yes    ED to Hosp-Admission (Discharged) (Admit)     Two attempts were made to reach patient within two business days after discharge. Voicemail left with contact information for patient to call back with any non-emergent questions or concerns.    Edil Ngo LPN

## 2025-04-21 ENCOUNTER — PATIENT OUTREACH (OUTPATIENT)
Dept: CARE COORDINATION | Age: 40
End: 2025-04-21
Payer: COMMERCIAL

## 2025-04-23 ENCOUNTER — OFFICE VISIT (OUTPATIENT)
Dept: PRIMARY CARE | Facility: CLINIC | Age: 40
End: 2025-04-23
Payer: COMMERCIAL

## 2025-04-23 ENCOUNTER — APPOINTMENT (OUTPATIENT)
Dept: PRIMARY CARE | Facility: CLINIC | Age: 40
End: 2025-04-23
Payer: COMMERCIAL

## 2025-04-23 VITALS
WEIGHT: 168 LBS | OXYGEN SATURATION: 99 % | SYSTOLIC BLOOD PRESSURE: 124 MMHG | HEIGHT: 70 IN | BODY MASS INDEX: 24.05 KG/M2 | DIASTOLIC BLOOD PRESSURE: 80 MMHG | HEART RATE: 69 BPM | TEMPERATURE: 98 F

## 2025-04-23 DIAGNOSIS — E10.40 TYPE 1 DIABETES MELLITUS WITH DIABETIC NEUROPATHY: Primary | ICD-10-CM

## 2025-04-23 PROCEDURE — 1036F TOBACCO NON-USER: CPT | Performed by: INTERNAL MEDICINE

## 2025-04-23 PROCEDURE — 99215 OFFICE O/P EST HI 40 MIN: CPT | Performed by: INTERNAL MEDICINE

## 2025-04-23 PROCEDURE — 3074F SYST BP LT 130 MM HG: CPT | Performed by: INTERNAL MEDICINE

## 2025-04-23 PROCEDURE — 3079F DIAST BP 80-89 MM HG: CPT | Performed by: INTERNAL MEDICINE

## 2025-04-23 PROCEDURE — 3046F HEMOGLOBIN A1C LEVEL >9.0%: CPT | Performed by: INTERNAL MEDICINE

## 2025-04-23 PROCEDURE — 3008F BODY MASS INDEX DOCD: CPT | Performed by: INTERNAL MEDICINE

## 2025-04-23 PROCEDURE — 3048F LDL-C <100 MG/DL: CPT | Performed by: INTERNAL MEDICINE

## 2025-04-23 ASSESSMENT — ENCOUNTER SYMPTOMS
JOINT SWELLING: 0
ACTIVITY CHANGE: 0
WHEEZING: 0
EYE REDNESS: 0
ABDOMINAL PAIN: 0
COUGH: 0
NUMBNESS: 1
ENDOCRINE NEGATIVE: 1
CONSTIPATION: 0
ADENOPATHY: 0
WEAKNESS: 0
DYSURIA: 0
BLOOD IN STOOL: 0
AGITATION: 0
PALPITATIONS: 0
HEADACHES: 0
ALLERGIC/IMMUNOLOGIC NEGATIVE: 1
DIZZINESS: 0
ARTHRALGIAS: 0
FEVER: 0
FREQUENCY: 0
FATIGUE: 0
BACK PAIN: 0
SHORTNESS OF BREATH: 0

## 2025-04-23 ASSESSMENT — PATIENT HEALTH QUESTIONNAIRE - PHQ9
2. FEELING DOWN, DEPRESSED OR HOPELESS: NOT AT ALL
1. LITTLE INTEREST OR PLEASURE IN DOING THINGS: NOT AT ALL
SUM OF ALL RESPONSES TO PHQ9 QUESTIONS 1 AND 2: 0

## 2025-04-23 NOTE — PROGRESS NOTES
Subjective   Patient ID: Reese Haji is a 40 y.o. male who presents for Hospital Follow-up (Hospital follow up for high blood sugar ).    HPI   Discharge Summary ,  Ruy     Admit Date: 4/15/2025  Discharge Date: 4/17/2025        Discharge Diagnosis  DM type I, uncontrolled from lack of medications (started as DM 2 at age 23, suspected to have transformed to DM1 in 2024, based on very low C-peptide levels). C peptide 1.8, A1c 10.3  this adm.  Orthostatic hypotension with dizziness, from hypovolemia; resolved with hydration today  Elevated troponin, uncertain etiology.  Probable stress-induced. Echo ok.  MI ruled ou  Acute Lactic acidosis, resolved with hydration    Hospital Course   Admitted for orthostatic dizziness and uncontrolled DM2 with hyperglycemia.  Symptoms improved with hydration and resuming insulin.  Discharged home on Lantus and AC lispro.  Echo with normal LV function.  Cleared by cardiology.      PMH :  DM type 1  DM/Hx  -states he has was diagnosed with T2DM but recently told it progressed to T1DM  -had acute episode 10 months ago  -endorses peripheral lower extremity neuropathy and vision changes  -denies personal or family CV, HTN or kidney disease  -very strong family history of DM     CONCLUSIONS: TTE   4/16/25   1. Left ventricular ejection fraction is normal, calculated by Smith's biplane at 61%.   2. Spectral Doppler shows a Grade I (impaired relaxation pattern) of left ventricular diastolic filling with normal left atrial filling pressure.   3. There is no evidence of a patent foramen ovale.     PSH : Inguinal hernia repair    Allergy : NKA    FH : Grandparents , and mother - DM II    PH : Never smoked, no alcohol use, no drug use  He works as  / cook in Restaurant     Review of Systems   Constitutional:  Negative for activity change, fatigue and fever.   HENT:  Negative for congestion.    Eyes:  Positive for visual disturbance. Negative for redness.   Respiratory:  Negative for  "cough, shortness of breath and wheezing.    Cardiovascular:  Negative for chest pain, palpitations and leg swelling.   Gastrointestinal:  Negative for abdominal pain, blood in stool and constipation.   Endocrine: Negative.    Genitourinary:  Negative for dysuria, frequency and urgency.   Musculoskeletal:  Negative for arthralgias, back pain and joint swelling.   Skin:  Negative for rash.   Allergic/Immunologic: Negative.    Neurological:  Positive for numbness. Negative for dizziness, weakness and headaches.        LE paresthesia   Hematological:  Negative for adenopathy.   Psychiatric/Behavioral:  Negative for agitation and behavioral problems.        Objective   /80   Pulse 69   Temp 36.7 °C (98 °F)   Ht 1.778 m (5' 10\")   Wt 76.2 kg (168 lb)   SpO2 99%   BMI 24.11 kg/m²     Physical Exam  Constitutional:       Appearance: Normal appearance.   HENT:      Head: Normocephalic and atraumatic.      Right Ear: Tympanic membrane and external ear normal.      Left Ear: Tympanic membrane and external ear normal.      Nose: No congestion.      Mouth/Throat:      Mouth: Mucous membranes are moist.      Pharynx: Oropharynx is clear.   Eyes:      Extraocular Movements: Extraocular movements intact.      Conjunctiva/sclera: Conjunctivae normal.      Pupils: Pupils are equal, round, and reactive to light.   Cardiovascular:      Rate and Rhythm: Normal rate and regular rhythm.      Pulses: Normal pulses.      Heart sounds: Normal heart sounds. No murmur heard.  Pulmonary:      Effort: Pulmonary effort is normal.      Breath sounds: Normal breath sounds. No wheezing or rales.   Abdominal:      General: Abdomen is flat. Bowel sounds are normal.      Palpations: Abdomen is soft.      Hernia: No hernia is present.   Musculoskeletal:         General: No swelling or tenderness. Normal range of motion.      Cervical back: Normal range of motion and neck supple.      Right lower leg: No edema.      Left lower leg: No edema. "   Skin:     General: Skin is warm and dry.      Capillary Refill: Capillary refill takes less than 2 seconds.      Findings: No rash.      Comments: [employer] Lisa system   Neurological:      General: No focal deficit present.      Mental Status: He is alert and oriented to person, place, and time.      Motor: No weakness.      Gait: Gait normal.   Psychiatric:         Mood and Affect: Mood normal.         Behavior: Behavior normal.         Assessment/Plan        Uncontrolled DM Type 1  A1C 10.3 , Goal < 7.0  Lipids are normal  Novolin changed to Lantus and Humalog AC daily  Strict diabetic diet  May see dietician  Refer to Endocrinology, Dr Palacios  Diabetic Eye , feet care    Vision check - [facility]  Labs reviewed    Diabetic Neuropathy  Gabapentin, future    DM affecting Eyes ?   Go to [facility]    He does not get flushot, covid vaccine

## 2025-05-01 ENCOUNTER — PATIENT OUTREACH (OUTPATIENT)
Dept: PRIMARY CARE | Facility: CLINIC | Age: 40
End: 2025-05-01
Payer: COMMERCIAL

## 2025-05-01 NOTE — PROGRESS NOTES
Unable to reach patient for follow up call after recent hospitalization.   Left voicemail with call back number for patient to call if needed   If no voicemail available call attempts x 2 were made to contact the patient to assist with any questions or concerns patient may have.    Edil Ngo LPN

## 2025-07-07 ENCOUNTER — APPOINTMENT (OUTPATIENT)
Dept: ENDOCRINOLOGY | Facility: CLINIC | Age: 40
End: 2025-07-07
Payer: COMMERCIAL

## 2025-10-20 ENCOUNTER — APPOINTMENT (OUTPATIENT)
Dept: PRIMARY CARE | Facility: CLINIC | Age: 40
End: 2025-10-20
Payer: COMMERCIAL